# Patient Record
Sex: FEMALE | Race: WHITE | NOT HISPANIC OR LATINO | Employment: OTHER | URBAN - METROPOLITAN AREA
[De-identification: names, ages, dates, MRNs, and addresses within clinical notes are randomized per-mention and may not be internally consistent; named-entity substitution may affect disease eponyms.]

---

## 2017-09-27 ENCOUNTER — HOSPITAL ENCOUNTER (EMERGENCY)
Facility: CLINIC | Age: 67
Discharge: HOME | End: 2017-09-27
Attending: EMERGENCY MEDICINE | Admitting: EMERGENCY MEDICINE
Payer: COMMERCIAL

## 2017-09-27 ENCOUNTER — APPOINTMENT (OUTPATIENT)
Dept: CT IMAGING | Facility: CLINIC | Age: 67
End: 2017-09-27
Attending: EMERGENCY MEDICINE
Payer: COMMERCIAL

## 2017-09-27 ENCOUNTER — APPOINTMENT (OUTPATIENT)
Dept: ULTRASOUND IMAGING | Facility: CLINIC | Age: 67
End: 2017-09-27
Attending: EMERGENCY MEDICINE
Payer: COMMERCIAL

## 2017-09-27 VITALS
HEART RATE: 88 BPM | SYSTOLIC BLOOD PRESSURE: 157 MMHG | RESPIRATION RATE: 20 BRPM | TEMPERATURE: 98 F | OXYGEN SATURATION: 100 % | DIASTOLIC BLOOD PRESSURE: 83 MMHG

## 2017-09-27 DIAGNOSIS — R79.89 D-DIMER, ELEVATED: ICD-10-CM

## 2017-09-27 DIAGNOSIS — M25.561 ACUTE PAIN OF RIGHT KNEE: ICD-10-CM

## 2017-09-27 DIAGNOSIS — R07.9 CHEST PAIN, UNSPECIFIED TYPE: ICD-10-CM

## 2017-09-27 LAB
ANION GAP SERPL CALCULATED.3IONS-SCNC: 7 MMOL/L (ref 3–14)
BASOPHILS # BLD AUTO: 0.1 10E9/L (ref 0–0.2)
BASOPHILS NFR BLD AUTO: 1.3 %
BUN SERPL-MCNC: 14 MG/DL (ref 7–30)
CALCIUM SERPL-MCNC: 8.9 MG/DL (ref 8.5–10.1)
CHLORIDE SERPL-SCNC: 106 MMOL/L (ref 94–109)
CO2 SERPL-SCNC: 26 MMOL/L (ref 20–32)
CREAT BLD-MCNC: 0.7 MG/DL (ref 0.52–1.04)
CREAT SERPL-MCNC: 0.67 MG/DL (ref 0.52–1.04)
DIFFERENTIAL METHOD BLD: NORMAL
EOSINOPHIL # BLD AUTO: 0.2 10E9/L (ref 0–0.7)
EOSINOPHIL NFR BLD AUTO: 3.7 %
ERYTHROCYTE [DISTWIDTH] IN BLOOD BY AUTOMATED COUNT: 12.8 % (ref 10–15)
GFR SERPL CREATININE-BSD FRML MDRD: 83 ML/MIN/1.7M2
GFR SERPL CREATININE-BSD FRML MDRD: 87 ML/MIN/1.7M2
GLUCOSE SERPL-MCNC: 96 MG/DL (ref 70–99)
HCT VFR BLD AUTO: 37 % (ref 35–47)
HGB BLD-MCNC: 12.3 G/DL (ref 11.7–15.7)
IMM GRANULOCYTES # BLD: 0 10E9/L (ref 0–0.4)
IMM GRANULOCYTES NFR BLD: 0.4 %
INTERPRETATION ECG - MUSE: NORMAL
LYMPHOCYTES # BLD AUTO: 2.1 10E9/L (ref 0.8–5.3)
LYMPHOCYTES NFR BLD AUTO: 45.2 %
MCH RBC QN AUTO: 32.4 PG (ref 26.5–33)
MCHC RBC AUTO-ENTMCNC: 33.2 G/DL (ref 31.5–36.5)
MCV RBC AUTO: 97 FL (ref 78–100)
MONOCYTES # BLD AUTO: 0.3 10E9/L (ref 0–1.3)
MONOCYTES NFR BLD AUTO: 6.4 %
NEUTROPHILS # BLD AUTO: 2 10E9/L (ref 1.6–8.3)
NEUTROPHILS NFR BLD AUTO: 43 %
NRBC # BLD AUTO: 0 10*3/UL
NRBC BLD AUTO-RTO: 0 /100
PLATELET # BLD AUTO: 242 10E9/L (ref 150–450)
POTASSIUM SERPL-SCNC: 4.1 MMOL/L (ref 3.4–5.3)
RBC # BLD AUTO: 3.8 10E12/L (ref 3.8–5.2)
SODIUM SERPL-SCNC: 139 MMOL/L (ref 133–144)
TROPONIN I SERPL-MCNC: <0.015 UG/L (ref 0–0.04)
WBC # BLD AUTO: 4.6 10E9/L (ref 4–11)

## 2017-09-27 PROCEDURE — 93005 ELECTROCARDIOGRAM TRACING: CPT

## 2017-09-27 PROCEDURE — 25000128 H RX IP 250 OP 636: Performed by: EMERGENCY MEDICINE

## 2017-09-27 PROCEDURE — 99285 EMERGENCY DEPT VISIT HI MDM: CPT | Mod: 25

## 2017-09-27 PROCEDURE — 93971 EXTREMITY STUDY: CPT | Mod: RT

## 2017-09-27 PROCEDURE — 82565 ASSAY OF CREATININE: CPT

## 2017-09-27 PROCEDURE — 71260 CT THORAX DX C+: CPT

## 2017-09-27 PROCEDURE — 84484 ASSAY OF TROPONIN QUANT: CPT | Performed by: EMERGENCY MEDICINE

## 2017-09-27 PROCEDURE — 85025 COMPLETE CBC W/AUTO DIFF WBC: CPT | Performed by: EMERGENCY MEDICINE

## 2017-09-27 PROCEDURE — 80048 BASIC METABOLIC PNL TOTAL CA: CPT | Performed by: EMERGENCY MEDICINE

## 2017-09-27 RX ORDER — IOPAMIDOL 755 MG/ML
500 INJECTION, SOLUTION INTRAVASCULAR ONCE
Status: COMPLETED | OUTPATIENT
Start: 2017-09-27 | End: 2017-09-27

## 2017-09-27 RX ORDER — ACETAMINOPHEN 325 MG/1
650 TABLET ORAL EVERY 6 HOURS PRN
COMMUNITY

## 2017-09-27 RX ADMIN — SODIUM CHLORIDE 94 ML: 9 INJECTION, SOLUTION INTRAVENOUS at 11:28

## 2017-09-27 RX ADMIN — IOPAMIDOL 76 ML: 755 INJECTION, SOLUTION INTRAVENOUS at 11:28

## 2017-09-27 ASSESSMENT — ENCOUNTER SYMPTOMS
SHORTNESS OF BREATH: 1
VOMITING: 0
COUGH: 1
FEVER: 0
NAUSEA: 1

## 2017-09-27 NOTE — ED AVS SNAPSHOT
[facility]    [address]    [facility] [address]    Phone:  [phone]    Fax:  [phone]                                       Rianna Duffy   MRN: [id number]    Department:  [facility]   Date of Visit:  9/27/2017           After Visit Summary Signature Page     I have received my discharge instructions, and my questions have been answered. I have discussed any challenges I see with this plan with the nurse or doctor.    ..........................................................................................................................................  Patient/Patient Representative Signature      ..........................................................................................................................................  Patient Representative Print Name and Relationship to Patient    ..................................................               ................................................  Date                                            Time    ..........................................................................................................................................  Reviewed by Signature/Title    ...................................................              ..............................................  Date                                                            Time

## 2017-09-27 NOTE — ED AVS SNAPSHOT
[facility]    [address]    [facility] [address]    Phone:  [phone]    Fax:  [phone]                                       Rianna Duffy   MRN: [id number]    Department:  [facility]   Date of Visit:  9/27/2017           Patient Information     Date Of Birth          1950        Your diagnoses for this visit were:     D-dimer, elevated     Chest pain, unspecified type     Acute pain of right knee        You were seen by Velasquez Gill DO.      Follow-up Information     Follow up with Emily Forman MD. Call in 2 days.    Why:  As needed    Contact information:    [facility]  [address]  [place] [address]  [phone]          Follow up with [facility].    Specialty:  EMERGENCY MEDICINE    Why:  If symptoms worsen    Contact information:    [address]  [facility] [address]  [phone]        Discharge Instructions         ???? ? ?????? [Knee Pain, Uncertain Cause]  ?????????? ????????? ???????????????? ?????? ????????????? ???? ? ??????. ? ??? ????????? ?????????? ??????, ?????????????? ??????; ?????? ???????? ????????? ???????; ?????? ?????????? ?????? ??? ?????????? ? ?????? ???????. ????? ????? ??????????? ????????, ???????????? ??????????? ????????? ??????? ? ???? ??? ??????. ?? ?????? ?????? ?????? ????????? ??????????? ???????. ???? ???? ????????? ?? ?????????, ????? ????????????? ?????????? ?????????? ? ???????????? .    ???????? ????  1. ?? ?????????? ???????????? ???? ??? ????? ??????, ???? ?? ??????? ????.  2. ? ?????? ???? ????????????? ?????? ?? ????? (?????????? ? ????????? ??????????? ????? ?? ?????) ? ???????? ????? ?? 20 ????? ????? ?????? 1-2 ????. ? ??????? ????????? ???? ???? ????????????? ?????? ?? ????? 3-4 ???? ? ????, ? ? ?????????? - ?? ?????????????, ????? ????????? ???? ??? ????????.  3. ????? ?????????  ???????????? (Tylenol) ??? ????????? (Motrin, Advil), ????? ??????? ????, ???? ?? ???? ????????? ?????? ??????????. [ ??????????. ??? ??????? ??????????? ??????????? ?????? ??? ?????, ? ????? ??? ???????????? ???? ??????? ??? ????????????? ? ?????????-???????? ?????? ??????????????????? ? ??????, ?????? ??? ????????? ??? ?????????.]  4. ???? ??? ????????????? ???????????? ????????? ??? ??????????????? ??????????????? ??? ??????, ?? ?????????? ???? ????? ?? ??????? ????, ???? ??? ???????? ????. ????? ??? ??? ????????? ? ???????? ??????? ??? ????? ?????? ???????? ? ?????? ????, ????????????? ?? ????? ??????.  5. ??? ????????????? ????????? ?????? ? ????????? ?????? (VELCRO):    ????? ???????????? ????????, ????? ????????? ???;    ???????? ????? ??????? ?? ????? ??????? ??? ???, ???? ??? ?? ???? ???? ????????.  ?????????? ?????? ??????????  ? ?????? ????? ? ??????? 1-2 ?????? ??? ? ???????????? ? ??????????? ??????????, ???? ?? ??????????? ?????????.  [??????????. ???? ??? ??????? ???????, ?????????? ???????? ???????????. ?? ?????? ????????? ? ?????? ??????????? ????? ????????, ??????? ????? ???????? ?? ???????.]  ??????????????? ?????????? ?? ??????????? ???????  ??? ????? ?? ????????????? ???? ?????????:    ????? ??? ?????? ?? ???? ???????, ????????, ??????, ??????, ????????? ???????? ???????????;    ???????? ???? ??? ???????? ? ?????? ??? ?????? ;    ?????? ??? ?????? ?????? ?? ????? ??? ??????????;    ????????? ??????????? ???????? ? ?????? ???????? ;    ????????? ?????????? ??????????? ??? ????????? ;    ?????? ??? ???? ? ????? ;    ??? ??????????? 100.4?F (38?C) ??? ???? ??? ? ???????????? ? ?????????????? ?????.  Date Last Reviewed: 11/23/2015 2000-2017 [employer]. [address] [place] [address], [place] [address]. All rights reserved. This information is not intended as a substitute for professional medical care. Always follow your healthcare professional's instructions.          ???? ? ?????,????????  ????????????? [Chest Pain, Uncertain Cause]  ?? ??????????? ?????? ???????????? ??????, ?????? ??????? ???? ? ????? ?? ????. ???? ????????? ?? ???????????? ?????????, ? ???? ????? ?? ????????? ?????????????. ?????? ?????? ???????? ?????????? ??????????? ????? ?????????? ?????? ?????-?? ?????. ??????? ?????????? ??????? ?? ?????????? ????????? ?????????, ????????????? ?????.    ???????? ????  1. ?? ??????????? ??????? ??????, ?????????? ??????, ?????????.  2. ???? ????????? ????????????? ?????????, ?????????? ?? ???????? ????????? ????? .  ?????????? ?????? ??????????  ? ?????? ????? ??? ? ?????? ??????????? ?????????? ???????? ?????????? ????????? ??? ? ??? ??????, ???? ????????? ?? ????????? ? ??????? ????????? 24 ?????.  ??????????????? ?????????? ?? ??????????? ???????  ??? ????? ?? ????????????? ???? ?????????:    ????????? ????????? ????: ?????????? ??????? ????????, ???? ???????????, ?????? ?????? ??? ???????????????? ?? ?????, ????, ???, ??????? ??? ?????);    ?????? ??? ????????????? ???? ??? ???????;    ????????, ?????????????? ??? ?????? ????????;    ???????????? ???????? ??????? ????? ??? ??????;    ??? ??????????? 100.4?F (38?C) ??? ???? ??? ? ???????????? ? ?????????????? ?????;    ???????, ????? ??? ?????????? ???? ????.  Date Last Reviewed: 12/30/2015 2000-2017 [employer]. [address] [place] [address]. All rights reserved. This information is not intended as a substitute for professional medical care. Always follow your healthcare professional's instructions.          24 Hour Appointment Hotline       To make an appointment at any [facility], call [phone] ([phone]). If you don't have a family doctor or clinic, we will help you find one. [facility] clinics are conveniently located to serve the needs of you and your family.             Review of your medicines      Our records show that you are taking the medicines listed below. If these are incorrect,  please call your family doctor or clinic.        Dose / Directions Last dose taken    ASPIRIN PO        Refills:  0        TYLENOL PO        Refills:  0        UNKNOWN TO PATIENT   Indication:  Pt on BP and Thyroid medicine unsure of name        Refills:  0                Procedures and tests performed during your visit     Basic metabolic panel    CBC with platelets differential    CT Chest Pulmonary Embolism w Contrast    Creatinine POCT    EKG 12 lead    Troponin I    US Lower Extremity Venous Duplex Right      Orders Needing Specimen Collection     None      Pending Results     Date and Time Order Name Status Description    9/27/2017 1038 US Lower Extremity Venous Duplex Right Preliminary             Pending Culture Results     No orders found from 9/25/2017 to 9/28/2017.            Pending Results Instructions     If you had any lab results that were not finalized at the time of your Discharge, you can call the ED Lab Result RN at [phone]. You will be contacted by this team for any positive Lab results or changes in treatment. The nurses are available 7 days a week from 10A to 6:30P.  You can leave a message 24 hours per day and they will return your call.        Test Results From Your Hospital Stay        9/27/2017 11:02 AM      Component Results     Component Value Ref Range & Units Status    WBC 4.6 4.0 - 11.0 10e9/L Final    RBC Count 3.80 3.8 - 5.2 10e12/L Final    Hemoglobin 12.3 11.7 - 15.7 g/dL Final    Hematocrit 37.0 35.0 - 47.0 % Final    MCV 97 78 - 100 fl Final    MCH 32.4 26.5 - 33.0 pg Final    MCHC 33.2 31.5 - 36.5 g/dL Final    RDW 12.8 10.0 - 15.0 % Final    Platelet Count 242 150 - 450 10e9/L Final    Diff Method Automated Method  Final    % Neutrophils 43.0 % Final    % Lymphocytes 45.2 % Final    % Monocytes 6.4 % Final    % Eosinophils 3.7 % Final    % Basophils 1.3 % Final    % Immature Granulocytes 0.4 % Final    Nucleated RBCs 0 0 /100 Final    Absolute Neutrophil 2.0 1.6 - 8.3  10e9/L Final    Absolute Lymphocytes 2.1 0.8 - 5.3 10e9/L Final    Absolute Monocytes 0.3 0.0 - 1.3 10e9/L Final    Absolute Eosinophils 0.2 0.0 - 0.7 10e9/L Final    Absolute Basophils 0.1 0.0 - 0.2 10e9/L Final    Abs Immature Granulocytes 0.0 0 - 0.4 10e9/L Final    Absolute Nucleated RBC 0.0  Final         9/27/2017 11:22 AM      Component Results     Component Value Ref Range & Units Status    Sodium 139 133 - 144 mmol/L Final    Potassium 4.1 3.4 - 5.3 mmol/L Final    Chloride 106 94 - 109 mmol/L Final    Carbon Dioxide 26 20 - 32 mmol/L Final    Anion Gap 7 3 - 14 mmol/L Final    Glucose 96 70 - 99 mg/dL Final    Urea Nitrogen 14 7 - 30 mg/dL Final    Creatinine 0.67 0.52 - 1.04 mg/dL Final    GFR Estimate 87 >60 mL/min/1.7m2 Final    Non  GFR Calc    GFR Estimate If Black >90 >60 mL/min/1.7m2 Final    African American GFR Calc    Calcium 8.9 8.5 - 10.1 mg/dL Final         9/27/2017 11:22 AM      Component Results     Component Value Ref Range & Units Status    Troponin I ES <0.015 0.000 - 0.045 ug/L Final    The 99th percentile for upper reference range is 0.045 ug/L.  Troponin values   in the range of 0.045 - 0.120 ug/L may be associated with risks of adverse   clinical events.           9/27/2017 11:30 AM      Narrative     VENOUS ULTRASOUND RIGHT LEG  9/27/2017 11:20 AM     HISTORY: Right knee pain, swelling status post surgery.    COMPARISON: None.    FINDINGS:  Examination of the deep veins with graded compression and  color flow Doppler with spectral wave form analysis shows no evidence  of thrombus in the common femoral vein, femoral vein, popliteal vein  or calf veins. At the area of patient's pain there is a small  hypoechoic fluid collection measuring 1.1 x 1.0 x 0.3 cm.        Impression     IMPRESSION: No evidence of deep venous thrombosis.  Small fluid  collection along the lateral site of patient's pain measuring 1.1 x  1.0 x 0.3 cm.         9/27/2017 12:05 PM      Narrative      CT CHEST PULMONARY EMBOLISM WITH CONTRAST  9/27/2017 11:34 AM     HISTORY: Pleuritic chest pain, dyspnea, positive D-dimer in clinic.    COMPARISON: None.    TECHNIQUE: Pulmonary embolism protocol with attention to the pulmonary  arteries.  IV contrast: 76 mL Isovue-370. Coronal reconstructions.  Radiation dose for this scan was reduced using automated exposure  control, adjustment of the mA and/or kV according to patient size, or  iterative reconstruction technique.    FINDINGS: No thoracic aortic dissection. No pulmonary embolism  identified. There is liver low density which could relate to the  contrast bolus or fatty infiltration. No pleural effusion or acute  consolidation.        Impression     IMPRESSION: No PE identified.    MAGRARITO HUDSON MD         9/27/2017 10:51 AM      Component Results     Component Value Ref Range & Units Status    Creatinine 0.7 0.52 - 1.04 mg/dL Final    GFR Estimate 83 >60 mL/min/1.7m2 Final    GFR Estimate If Black >90 >60 mL/min/1.7m2 Final                Clinical Quality Measure: Blood Pressure Screening     Your blood pressure was checked while you were in the emergency department today. The last reading we obtained was  BP: 157/83 . Please read the guidelines below about what these numbers mean and what you should do about them.  If your systolic blood pressure (the top number) is less than 120 and your diastolic blood pressure (the bottom number) is less than 80, then your blood pressure is normal. There is nothing more that you need to do about it.  If your systolic blood pressure (the top number) is 120-139 or your diastolic blood pressure (the bottom number) is 80-89, your blood pressure may be higher than it should be. You should have your blood pressure rechecked within a year by a primary care provider.  If your systolic blood pressure (the top number) is 140 or greater or your diastolic blood pressure (the bottom number) is 90 or greater, you may have high blood pressure.  "High blood pressure is treatable, but if left untreated over time it can put you at risk for heart attack, stroke, or kidney failure. You should have your blood pressure rechecked by a primary care provider within the next 4 weeks.  If your provider in the emergency department today gave you specific instructions to follow-up with your doctor or provider even sooner than that, you should follow that instruction and not wait for up to 4 weeks for your follow-up visit.        Thank you for choosing [facility]       Thank you for choosing [facility] for your care. Our goal is always to provide you with excellent care. Hearing back from our patients is one way we can continue to improve our services. Please take a few minutes to complete the written survey that you may receive in the mail after you visit with us. Thank you!        [employer] Information     [employer] lets you send messages to your doctor, view your test results, renew your prescriptions, schedule appointments and more. To sign up, go to [email or web address] . Click on \"Log in\" on the left side of the screen, which will take you to the Welcome page. Then click on \"Sign up Now\" on the right side of the page.     You will be asked to enter the access code listed below, as well as some personal information. Please follow the directions to create your username and password.     Your access code is: [email or web address]  Expires: 2017 12:45 PM     Your access code will  in 90 days. If you need help or a new code, please call your [facility] clinic or [phone].        Care EveryWhere ID     This is your Care EveryWhere ID. This could be used by other organizations to access your [facility] medical records  [id number]        Equal Access to Services     VALENCIA THRASHER : Kana Savage, claus kelly, horacio olson. So [facility] [phone].    ATENCIÓN: Si habla español, tiene a carter disposición " servicios gratuitos de asistencia lingüística. Kaleigh mendoza [phone].    We comply with applicable federal civil rights laws and [place] laws. We do not discriminate on the basis of race, color, national origin, age, disability sex, sexual orientation or gender identity.            After Visit Summary       This is your record. Keep this with you and show to your community pharmacist(s) and doctor(s) at your next visit.

## 2017-09-27 NOTE — ED PROVIDER NOTES
History     Chief Complaint:  Shortness of Breath    The history is provided by the patient and a relative.  used: Daughter translating.      Rianna Duffy is a 67 year old female with a history of hypertension, hypothyroid, and is one month s/p right TKA who presents to the emergency department today for evaluation of shortness of breath and is accompanied by her  and daughter. The patient had a right knee replacement performed by Dr. Neftali Mendez of [facility] on 8/25, and nearly two weeks ago, developed a cough and shortness of breath. The patient was seen by her MD yesterday, with assessment, plan, and results below. Today, the patient was instructed to present to the emergency department after her clinic called, informing them of a positive D dimer. Here, the patient reports some inner right knee pain varying in location over the past few days, some pleuritic chest pain, and some nausea two days ago. She denies nausea, vomiting, or fevers here.     Office Visit from 9/26:  Rianna was seen today for follow up.    Diagnoses and all orders for this visit:  Chest tightness  SOB (shortness of breath)  Patient notes this has been occurring for the past 10-14 days, not worsening, non-exertional. Possibly secondary to deconditioning or atelectasis following surgery; however, cannot rule out cardiac source or DVT and PE as she is approximately 1 month post-op from right TKA, No swelling, pain, erythema, or warmth in her legs. Wells criteria low risk. Discussed with patient options of going to [facility] or emergency department for CTA or more rapid ddimer versus clinic workup. They prefer to do clinic workup. 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 CXR normal. Will order d dimer. Discussed with patient that if this is elevated, will need to go to [facility] or emergency department tonight for further evaluation. Consider stress test if all testing is  normal.    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 Degenerative changes in the visualized thoracic spine    Allergies:  No Known Drug Allergies    Medications:    Levothyroxine  Aspirin  Diovan    Past Medical History:    Hypertension  Hypothyroid    Past Surgical History:    Right TKA    Family History:    History reviewed. No pertinent family history.     Social History:  The patient was accompanied to the ED by her  and daughter.  Marital Status:   [2]     Review of Systems   Constitutional: Negative for fever.   Respiratory: Positive for cough and shortness of breath.    Cardiovascular: Positive for chest pain.   Gastrointestinal: Positive for nausea. Negative for vomiting.   Musculoskeletal:        Right knee pain   All other systems reviewed and are negative.    Physical Exam   Vitals:  Patient Vitals for the past 24 hrs:   BP Temp Temp src Pulse Heart Rate Resp SpO2   09/27/17 1251 - - - - 74 - 100 %   09/27/17 1240 157/83 - - - 75 - 100 %   09/27/17 1210 154/84 - - - 79 - 100 %   09/27/17 1040 133/90 - - - 76 - 96 %   09/27/17 1010 134/87 - - - 79 - 97 %   09/27/17 1005 137/78 - - - - - 96 %   09/27/17 0924 158/83 98  F (36.7  C) Oral 88 - 20 97 %      Physical Exam  Constitutional: Patient appears well-developed and well-nourished. There is mild distress.   Head: No external signs of trauma noted.  Neck: No JVD noted  Eyes: Pupils are equal, round, and reactive to light.   Cardiovascular: Normal rate, regular rhythm and normal heart sounds.  Exam reveals no gallop and no friction rub.  No murmur heard. Normal peripheral pulses.  Pulmonary/Chest: Effort normal and breath sounds normal. No respiratory distress. Patient has no wheezes. Patient has no rales.   Abdominal: Soft. There is no tenderness.   Extremities: No edema noted. Mild right knee swelling with lateral tenderness.   Neurological: Patient is alert and  oriented to person, place, and time.   Skin: Skin is warm and dry. There is no diaphoresis noted. Surgical incision appears well healed. No drainage. No erythema noted.    Emergency Department Course     ECG:  ECG taken at 0933, ECG read at 1040  Normal sinus rhythm  Normal ECG  Rate 78 bpm. IN interval 156. QRS duration 82. QT/QTc 382/435. P-R-T axes 20 15 59.    Imaging:  Radiology findings were communicated with the patient and family who voiced understanding of the findings.    CT Chest Pulmonary Embolism w contrast  No pulmonary embolism identified.  Reading per radiology    US Lower Extremity Venous Duplex Right  No evidence of deep venous thrombosis.  Small fluid  collection along the lateral site of patient's pain measuring 1.1 x  1.0 x 0.3 cm.  Reading per radiology    Laboratory:  Laboratory findings were communicated with the patient and family who voiced understanding of the findings.    CBC: AWNL. (WBC 4.6, HGB 12.3, )   BMP: AWNL (Creatinine 0.67)  Creatinine POCT: 0.7, GFR 83  Troponin (Collected 1008): <0.015    Emergency Department Course:  Nursing notes and vitals reviewed.  I performed an exam of the patient as documented above.   IV was inserted and blood was drawn for laboratory testing, results above.  The patient was sent for a CT Chest Pulmonary Embolism w contrast and US Lower Extremity Venous Duplex Right while in the emergency department, results above.   1101: I spoke with Dr. Emily Forman of the family practice service from [facility] regarding patient's presentation, findings, and plan of care.  At 1254 the patient was rechecked and patient and family were updated on the results of laboratory and imaging studies.   I discussed the treatment plan with the patient and family. They expressed understanding of this plan and consented to discharge. They will be discharged home with instructions for care and follow up. In addition, the patient will return to the emergency  department if their symptoms persist, worsen, if new symptoms arise or if there is any concern.  All questions were answered.  I personally reviewed the laboratory and imaging results with the patient and family and answered all related questions prior to discharge.    Impression & Plan      Medical Decision Making:  Rianna Duffy is a 67 year old female who presents to the emergency department for evaluation of an elevated D dimer. Please see the HPI and examination for specifics. I did discuss the case with the patient's primary care provider and reviewed the note as well. The patient was low risk per Wells criteria, so appropriate D dimer testing was ordered and was unfortunately elevated. Ultrasound of her right lower extremity as well as CT of her chest did not reveal any occlusive thrombus or embolism. At this time, the patient is stable for discharge and should follow up in the outpatient setting. Anticipatory guidance given prior to discharge.    Diagnosis:    ICD-10-CM    1. D-dimer, elevated R79.89    2. Chest pain, unspecified type R07.9    3. Acute pain of right knee M25.561      Disposition:   Home    Scribe Disclosure:  Marie KASPER am serving as a scribe at 10:26 AM on 9/27/2017 to document services personally performed by Velasquez Gill DO, based on my observations and the provider's statements to me.    9/27/2017   [facility] EMERGENCY DEPARTMENT       Velasquez Gill DO  09/27/17 1736

## 2017-09-27 NOTE — ED NOTES
A&Ox3, ABC's intact  Pt with knee replacement Aug 25th, about 2 weeks ago developed cough and shortness of breath, pt in to MD vero and daughter states they were called to come to ER because her test for a blood clot was positive.   PMH: see hx  Meds: Epic up to date per pt

## 2017-09-27 NOTE — DISCHARGE INSTRUCTIONS
???? ? ?????? [Knee Pain, Uncertain Cause]  ?????????? ????????? ???????????????? ?????? ????????????? ???? ? ??????. ? ??? ????????? ?????????? ??????, ?????????????? ??????; ?????? ???????? ????????? ???????; ?????? ?????????? ?????? ??? ?????????? ? ?????? ???????. ????? ????? ??????????? ????????, ???????????? ??????????? ????????? ??????? ? ???? ??? ??????. ?? ?????? ?????? ?????? ????????? ??????????? ???????. ???? ???? ????????? ?? ?????????, ????? ????????????? ?????????? ?????????? ? ???????????? .    ???????? ????  1. ?? ?????????? ???????????? ???? ??? ????? ??????, ???? ?? ??????? ????.  2. ? ?????? ???? ????????????? ?????? ?? ????? (?????????? ? ????????? ??????????? ????? ?? ?????) ? ???????? ????? ?? 20 ????? ????? ?????? 1-2 ????. ? ??????? ????????? ???? ???? ????????????? ?????? ?? ????? 3-4 ???? ? ????, ? ? ?????????? - ?? ?????????????, ????? ????????? ???? ??? ????????.  3. ????? ????????? ???????????? (Tylenol) ??? ????????? (Motrin, Advil), ????? ??????? ????, ???? ?? ???? ????????? ?????? ??????????. [ ??????????. ??? ??????? ??????????? ??????????? ?????? ??? ?????, ? ????? ??? ???????????? ???? ??????? ??? ????????????? ? ?????????-???????? ?????? ??????????????????? ? ??????, ?????? ??? ????????? ??? ?????????.]  4. ???? ??? ????????????? ???????????? ????????? ??? ??????????????? ??????????????? ??? ??????, ?? ?????????? ???? ????? ?? ??????? ????, ???? ??? ???????? ????. ????? ??? ??? ????????? ? ???????? ??????? ??? ????? ?????? ???????? ? ?????? ????, ????????????? ?? ????? ??????.  5. ??? ????????????? ????????? ?????? ? ????????? ?????? (VELCRO):    ????? ???????????? ????????, ????? ????????? ???;    ???????? ????? ??????? ?? ????? ??????? ??? ???, ???? ??? ?? ???? ???? ????????.  ?????????? ?????? ??????????  ? ?????? ????? ? ??????? 1-2 ?????? ??? ? ???????????? ? ??????????? ??????????, ???? ?? ??????????? ?????????.  [??????????. ???? ??? ??????? ???????, ?????????? ????????  ???????????. ?? ?????? ????????? ? ?????? ??????????? ????? ????????, ??????? ????? ???????? ?? ???????.]  ??????????????? ?????????? ?? ??????????? ???????  ??? ????? ?? ????????????? ???? ?????????:    ????? ??? ?????? ?? ???? ???????, ????????, ??????, ??????, ????????? ???????? ???????????;    ???????? ???? ??? ???????? ? ?????? ??? ?????? ;    ?????? ??? ?????? ?????? ?? ????? ??? ??????????;    ????????? ??????????? ???????? ? ?????? ???????? ;    ????????? ?????????? ??????????? ??? ????????? ;    ?????? ??? ???? ? ????? ;    ??? ??????????? 100.4?F (38?C) ??? ???? ??? ? ???????????? ? ?????????????? ?????.  Date Last Reviewed: 11/23/2015 2000-2017 [employer]. [address], [place] [address]. All rights reserved. This information is not intended as a substitute for professional medical care. Always follow your healthcare professional's instructions.          ???? ? ?????,???????? ????????????? [Chest Pain, Uncertain Cause]  ?? ??????????? ?????? ???????????? ??????, ?????? ??????? ???? ? ????? ?? ????. ???? ????????? ?? ???????????? ?????????, ? ???? ????? ?? ????????? ?????????????. ?????? ?????? ???????? ?????????? ??????????? ????? ?????????? ?????? ?????-?? ?????. ??????? ?????????? ??????? ?? ?????????? ????????? ?????????, ????????????? ?????.    ???????? ????  1. ?? ??????????? ??????? ??????, ?????????? ??????, ?????????.  2. ???? ????????? ????????????? ?????????, ?????????? ?? ???????? ????????? ????? .  ?????????? ?????? ??????????  ? ?????? ????? ??? ? ?????? ??????????? ?????????? ???????? ?????????? ????????? ??? ? ??? ??????, ???? ????????? ?? ????????? ? ??????? ????????? 24 ?????.  ??????????????? ?????????? ?? ??????????? ???????  ??? ????? ?? ????????????? ???? ?????????:    ????????? ????????? ????: ?????????? ??????? ????????, ???? ???????????, ?????? ?????? ??? ???????????????? ?? ?????, ????, ???, ??????? ??? ?????);    ?????? ??? ????????????? ???? ???  ???????;    ????????, ?????????????? ??? ?????? ????????;    ???????????? ???????? ??????? ????? ??? ??????;    ??? ??????????? 100.4?F (38?C) ??? ???? ??? ? ???????????? ? ?????????????? ?????;    ???????, ????? ??? ?????????? ???? ????.  Date Last Reviewed: 12/30/2015 2000-2017 [employer]. [address] [place] [address], [place] [address]. All rights reserved. This information is not intended as a substitute for professional medical care. Always follow your healthcare professional's instructions.

## 2021-06-20 ENCOUNTER — APPOINTMENT (OUTPATIENT)
Dept: CT IMAGING | Facility: CLINIC | Age: 71
DRG: 177 | End: 2021-06-20
Attending: EMERGENCY MEDICINE
Payer: COMMERCIAL

## 2021-06-20 ENCOUNTER — HOSPITAL ENCOUNTER (INPATIENT)
Facility: CLINIC | Age: 71
LOS: 3 days | Discharge: HOME | DRG: 177 | End: 2021-06-23
Attending: EMERGENCY MEDICINE | Admitting: INTERNAL MEDICINE
Payer: COMMERCIAL

## 2021-06-20 DIAGNOSIS — R55 SYNCOPE, UNSPECIFIED SYNCOPE TYPE: ICD-10-CM

## 2021-06-20 DIAGNOSIS — J12.82 PNEUMONIA DUE TO 2019 NOVEL CORONAVIRUS: Primary | ICD-10-CM

## 2021-06-20 DIAGNOSIS — U07.1 PNEUMONIA DUE TO 2019 NOVEL CORONAVIRUS: Primary | ICD-10-CM

## 2021-06-20 DIAGNOSIS — U07.1 2019 NOVEL CORONAVIRUS DISEASE (COVID-19): ICD-10-CM

## 2021-06-20 DIAGNOSIS — E87.1 HYPONATREMIA: ICD-10-CM

## 2021-06-20 LAB
ABO + RH BLD: NORMAL
ABO + RH BLD: NORMAL
ALBUMIN SERPL-MCNC: 2.7 G/DL (ref 3.4–5)
ALP SERPL-CCNC: 42 U/L (ref 40–150)
ALT SERPL W P-5'-P-CCNC: 31 U/L (ref 0–50)
ANION GAP SERPL CALCULATED.3IONS-SCNC: 6 MMOL/L (ref 3–14)
APTT PPP: 35 SEC (ref 22–37)
AST SERPL W P-5'-P-CCNC: 41 U/L (ref 0–45)
BASOPHILS # BLD AUTO: 0 10E9/L (ref 0–0.2)
BASOPHILS NFR BLD AUTO: 0 %
BILIRUB DIRECT SERPL-MCNC: 0.1 MG/DL (ref 0–0.2)
BILIRUB SERPL-MCNC: 0.4 MG/DL (ref 0.2–1.3)
BUN SERPL-MCNC: 22 MG/DL (ref 7–30)
CALCIUM SERPL-MCNC: 8.3 MG/DL (ref 8.5–10.1)
CHLORIDE SERPL-SCNC: 99 MMOL/L (ref 94–109)
CO2 SERPL-SCNC: 25 MMOL/L (ref 20–32)
CREAT SERPL-MCNC: 0.96 MG/DL (ref 0.52–1.04)
CRP SERPL-MCNC: 83.8 MG/L (ref 0–8)
D DIMER PPP FEU-MCNC: 0.8 UG/ML FEU (ref 0–0.5)
DIFFERENTIAL METHOD BLD: ABNORMAL
EOSINOPHIL # BLD AUTO: 0 10E9/L (ref 0–0.7)
EOSINOPHIL NFR BLD AUTO: 0 %
ERYTHROCYTE [DISTWIDTH] IN BLOOD BY AUTOMATED COUNT: 12.3 % (ref 10–15)
FIBRINOGEN PPP-MCNC: 490 MG/DL (ref 200–420)
GFR SERPL CREATININE-BSD FRML MDRD: 60 ML/MIN/{1.73_M2}
GLUCOSE SERPL-MCNC: 116 MG/DL (ref 70–99)
HCT VFR BLD AUTO: 34.2 % (ref 35–47)
HGB BLD-MCNC: 11.4 G/DL (ref 11.7–15.7)
IMM GRANULOCYTES # BLD: 0 10E9/L (ref 0–0.4)
IMM GRANULOCYTES NFR BLD: 0.3 %
INR PPP: 1.02 (ref 0.86–1.14)
LABORATORY COMMENT REPORT: ABNORMAL
LACTATE BLD-SCNC: 1.1 MMOL/L (ref 0.7–2)
LDH SERPL L TO P-CCNC: 288 U/L (ref 81–234)
LYMPHOCYTES # BLD AUTO: 1.1 10E9/L (ref 0.8–5.3)
LYMPHOCYTES NFR BLD AUTO: 31.1 %
MCH RBC QN AUTO: 31.4 PG (ref 26.5–33)
MCHC RBC AUTO-ENTMCNC: 33.3 G/DL (ref 31.5–36.5)
MCV RBC AUTO: 94 FL (ref 78–100)
MONOCYTES # BLD AUTO: 0.3 10E9/L (ref 0–1.3)
MONOCYTES NFR BLD AUTO: 7.8 %
NEUTROPHILS # BLD AUTO: 2.2 10E9/L (ref 1.6–8.3)
NEUTROPHILS NFR BLD AUTO: 60.8 %
NRBC # BLD AUTO: 0 10*3/UL
NRBC BLD AUTO-RTO: 0 /100
PLATELET # BLD AUTO: 160 10E9/L (ref 150–450)
POTASSIUM SERPL-SCNC: 3.5 MMOL/L (ref 3.4–5.3)
PROT SERPL-MCNC: 6.1 G/DL (ref 6.8–8.8)
RBC # BLD AUTO: 3.63 10E12/L (ref 3.8–5.2)
SARS-COV-2 RNA RESP QL NAA+PROBE: POSITIVE
SODIUM SERPL-SCNC: 130 MMOL/L (ref 133–144)
SPECIMEN EXP DATE BLD: NORMAL
SPECIMEN SOURCE: ABNORMAL
TROPONIN I SERPL-MCNC: <0.015 UG/L (ref 0–0.04)
WBC # BLD AUTO: 3.6 10E9/L (ref 4–11)

## 2021-06-20 PROCEDURE — 86140 C-REACTIVE PROTEIN: CPT | Performed by: INTERNAL MEDICINE

## 2021-06-20 PROCEDURE — C9803 HOPD COVID-19 SPEC COLLECT: HCPCS

## 2021-06-20 PROCEDURE — 99285 EMERGENCY DEPT VISIT HI MDM: CPT | Mod: 25

## 2021-06-20 PROCEDURE — XW033E5 INTRODUCTION OF REMDESIVIR ANTI-INFECTIVE INTO PERIPHERAL VEIN, PERCUTANEOUS APPROACH, NEW TECHNOLOGY GROUP 5: ICD-10-PCS | Performed by: INTERNAL MEDICINE

## 2021-06-20 PROCEDURE — 86900 BLOOD TYPING SEROLOGIC ABO: CPT | Performed by: INTERNAL MEDICINE

## 2021-06-20 PROCEDURE — 258N000003 HC RX IP 258 OP 636: Performed by: INTERNAL MEDICINE

## 2021-06-20 PROCEDURE — 250N000009 HC RX 250: Performed by: INTERNAL MEDICINE

## 2021-06-20 PROCEDURE — 86901 BLOOD TYPING SEROLOGIC RH(D): CPT | Performed by: INTERNAL MEDICINE

## 2021-06-20 PROCEDURE — 96360 HYDRATION IV INFUSION INIT: CPT | Mod: 59

## 2021-06-20 PROCEDURE — 250N000011 HC RX IP 250 OP 636: Performed by: EMERGENCY MEDICINE

## 2021-06-20 PROCEDURE — 85610 PROTHROMBIN TIME: CPT | Performed by: INTERNAL MEDICINE

## 2021-06-20 PROCEDURE — 120N000001 HC R&B MED SURG/OB

## 2021-06-20 PROCEDURE — 85025 COMPLETE CBC W/AUTO DIFF WBC: CPT | Performed by: EMERGENCY MEDICINE

## 2021-06-20 PROCEDURE — 36415 COLL VENOUS BLD VENIPUNCTURE: CPT | Performed by: INTERNAL MEDICINE

## 2021-06-20 PROCEDURE — 250N000013 HC RX MED GY IP 250 OP 250 PS 637: Performed by: EMERGENCY MEDICINE

## 2021-06-20 PROCEDURE — 85730 THROMBOPLASTIN TIME PARTIAL: CPT | Performed by: INTERNAL MEDICINE

## 2021-06-20 PROCEDURE — 250N000011 HC RX IP 250 OP 636: Performed by: INTERNAL MEDICINE

## 2021-06-20 PROCEDURE — 85384 FIBRINOGEN ACTIVITY: CPT | Performed by: INTERNAL MEDICINE

## 2021-06-20 PROCEDURE — 250N000009 HC RX 250: Performed by: EMERGENCY MEDICINE

## 2021-06-20 PROCEDURE — 80048 BASIC METABOLIC PNL TOTAL CA: CPT | Performed by: EMERGENCY MEDICINE

## 2021-06-20 PROCEDURE — 83615 LACTATE (LD) (LDH) ENZYME: CPT | Performed by: INTERNAL MEDICINE

## 2021-06-20 PROCEDURE — 85379 FIBRIN DEGRADATION QUANT: CPT | Performed by: EMERGENCY MEDICINE

## 2021-06-20 PROCEDURE — 36415 COLL VENOUS BLD VENIPUNCTURE: CPT | Performed by: EMERGENCY MEDICINE

## 2021-06-20 PROCEDURE — 87040 BLOOD CULTURE FOR BACTERIA: CPT | Performed by: EMERGENCY MEDICINE

## 2021-06-20 PROCEDURE — 258N000003 HC RX IP 258 OP 636: Performed by: EMERGENCY MEDICINE

## 2021-06-20 PROCEDURE — 84484 ASSAY OF TROPONIN QUANT: CPT | Performed by: EMERGENCY MEDICINE

## 2021-06-20 PROCEDURE — 250N000012 HC RX MED GY IP 250 OP 636 PS 637: Performed by: INTERNAL MEDICINE

## 2021-06-20 PROCEDURE — 99223 1ST HOSP IP/OBS HIGH 75: CPT | Mod: AI | Performed by: INTERNAL MEDICINE

## 2021-06-20 PROCEDURE — 83605 ASSAY OF LACTIC ACID: CPT | Performed by: EMERGENCY MEDICINE

## 2021-06-20 PROCEDURE — 80076 HEPATIC FUNCTION PANEL: CPT | Performed by: INTERNAL MEDICINE

## 2021-06-20 PROCEDURE — 93005 ELECTROCARDIOGRAM TRACING: CPT

## 2021-06-20 PROCEDURE — 71275 CT ANGIOGRAPHY CHEST: CPT

## 2021-06-20 PROCEDURE — 87635 SARS-COV-2 COVID-19 AMP PRB: CPT | Performed by: EMERGENCY MEDICINE

## 2021-06-20 RX ORDER — POLYETHYLENE GLYCOL 3350 17 G/17G
17 POWDER, FOR SOLUTION ORAL DAILY PRN
Status: DISCONTINUED | OUTPATIENT
Start: 2021-06-20 | End: 2021-06-23 | Stop reason: HOSPADM

## 2021-06-20 RX ORDER — VALSARTAN 160 MG/1
160 TABLET ORAL DAILY
Status: DISCONTINUED | OUTPATIENT
Start: 2021-06-21 | End: 2021-06-23 | Stop reason: HOSPADM

## 2021-06-20 RX ORDER — LEVOTHYROXINE SODIUM 100 UG/1
100 TABLET ORAL DAILY
Status: DISCONTINUED | OUTPATIENT
Start: 2021-06-21 | End: 2021-06-23 | Stop reason: HOSPADM

## 2021-06-20 RX ORDER — LIDOCAINE 40 MG/G
CREAM TOPICAL
Status: DISCONTINUED | OUTPATIENT
Start: 2021-06-20 | End: 2021-06-23 | Stop reason: HOSPADM

## 2021-06-20 RX ORDER — BISACODYL 10 MG
10 SUPPOSITORY, RECTAL RECTAL DAILY PRN
Status: DISCONTINUED | OUTPATIENT
Start: 2021-06-20 | End: 2021-06-23 | Stop reason: HOSPADM

## 2021-06-20 RX ORDER — ONDANSETRON 4 MG/1
4 TABLET, ORALLY DISINTEGRATING ORAL EVERY 6 HOURS PRN
Status: DISCONTINUED | OUTPATIENT
Start: 2021-06-20 | End: 2021-06-23 | Stop reason: HOSPADM

## 2021-06-20 RX ORDER — ASPIRIN 81 MG/1
81 TABLET ORAL DAILY
COMMUNITY

## 2021-06-20 RX ORDER — ASPIRIN 81 MG/1
81 TABLET ORAL DAILY
Status: DISCONTINUED | OUTPATIENT
Start: 2021-06-21 | End: 2021-06-23 | Stop reason: HOSPADM

## 2021-06-20 RX ORDER — IOPAMIDOL 755 MG/ML
500 INJECTION, SOLUTION INTRAVASCULAR ONCE
Status: COMPLETED | OUTPATIENT
Start: 2021-06-20 | End: 2021-06-20

## 2021-06-20 RX ORDER — ONDANSETRON 2 MG/ML
4 INJECTION INTRAMUSCULAR; INTRAVENOUS EVERY 6 HOURS PRN
Status: DISCONTINUED | OUTPATIENT
Start: 2021-06-20 | End: 2021-06-23 | Stop reason: HOSPADM

## 2021-06-20 RX ORDER — ACETAMINOPHEN 650 MG/1
650 SUPPOSITORY RECTAL EVERY 4 HOURS PRN
Status: DISCONTINUED | OUTPATIENT
Start: 2021-06-20 | End: 2021-06-23 | Stop reason: HOSPADM

## 2021-06-20 RX ORDER — LEVOTHYROXINE SODIUM 100 UG/1
100 TABLET ORAL DAILY
COMMUNITY

## 2021-06-20 RX ORDER — ACETAMINOPHEN 500 MG
1000 TABLET ORAL ONCE
Status: COMPLETED | OUTPATIENT
Start: 2021-06-20 | End: 2021-06-20

## 2021-06-20 RX ORDER — AMOXICILLIN 250 MG
1 CAPSULE ORAL 2 TIMES DAILY PRN
Status: DISCONTINUED | OUTPATIENT
Start: 2021-06-20 | End: 2021-06-23 | Stop reason: HOSPADM

## 2021-06-20 RX ORDER — VALSARTAN 160 MG/1
160 TABLET ORAL DAILY
COMMUNITY

## 2021-06-20 RX ORDER — ACETAMINOPHEN 325 MG/1
650 TABLET ORAL EVERY 4 HOURS PRN
Status: DISCONTINUED | OUTPATIENT
Start: 2021-06-20 | End: 2021-06-23 | Stop reason: HOSPADM

## 2021-06-20 RX ORDER — AMOXICILLIN 250 MG
2 CAPSULE ORAL 2 TIMES DAILY PRN
Status: DISCONTINUED | OUTPATIENT
Start: 2021-06-20 | End: 2021-06-23 | Stop reason: HOSPADM

## 2021-06-20 RX ADMIN — SODIUM CHLORIDE 96 ML: 9 INJECTION, SOLUTION INTRAVENOUS at 14:11

## 2021-06-20 RX ADMIN — ACETAMINOPHEN 1000 MG: 500 TABLET, FILM COATED ORAL at 13:21

## 2021-06-20 RX ADMIN — SODIUM CHLORIDE, POTASSIUM CHLORIDE, SODIUM LACTATE AND CALCIUM CHLORIDE 1000 ML: 600; 310; 30; 20 INJECTION, SOLUTION INTRAVENOUS at 13:22

## 2021-06-20 RX ADMIN — SODIUM CHLORIDE 50 ML: 9 INJECTION, SOLUTION INTRAVENOUS at 20:30

## 2021-06-20 RX ADMIN — REMDESIVIR 200 MG: 100 INJECTION, POWDER, LYOPHILIZED, FOR SOLUTION INTRAVENOUS at 19:37

## 2021-06-20 RX ADMIN — ENOXAPARIN SODIUM 40 MG: 40 INJECTION SUBCUTANEOUS at 19:44

## 2021-06-20 RX ADMIN — IOPAMIDOL 78 ML: 755 INJECTION, SOLUTION INTRAVENOUS at 14:11

## 2021-06-20 RX ADMIN — DEXAMETHASONE 6 MG: 2 TABLET ORAL at 19:45

## 2021-06-20 ASSESSMENT — ENCOUNTER SYMPTOMS
NAUSEA: 1
LIGHT-HEADEDNESS: 1
FEVER: 1
SHORTNESS OF BREATH: 1
COUGH: 1
VOMITING: 0

## 2021-06-20 ASSESSMENT — ACTIVITIES OF DAILY LIVING (ADL): ADLS_ACUITY_SCORE: 16

## 2021-06-20 NOTE — ED PROVIDER NOTES
History   Chief Complaint:  Loss of Consciousness      HPI The history is obtained through Micronesian language interpretations provided by the patient's daughter.      Rianna Duffy is a 70 year old female with history of hypertension and hyperlipidemia who presents via EMS for evaluation of a loss of consciousness. Over the last two months the patient has had an intermittent cough with associated shortness of breath, and two weeks ago she started to develop chest pain and pressure that is worse when coughing. Yesterday around 1200 the patient was with her daughter when she had an episode where her hands started to shake and move abnormally, her eyes rolled back, her tongue fell out, and she became unresponsive. Her daughter reports that during this time her lips appeared blue and she became responsive once again after approximately 30 seconds. When the patient woke up she did not recall the event but otherwise was not confused and recognized her daughter and knew where she was. She was not incontinent of bowel or bladder during this episode. She had another almost identical episode yesterday around 1500. Today shortly prior to arrival the patient started to feel lightheaded and nauseated, prompting her daughter to call EMS to bring her into the ED for evaluation. She did not lose consciousness today. EMS reported that the patient was not orthostatic and had room air oxygen saturations of 93%. Here in the ED the patient is also noted to have a fever. They have not been taking her temperature at home and the patient had not complained to her daughter of feeling febrile. She has not traveled recently. She has not had any vomiting. She has never had similar episodes.     Review of Systems   Constitutional: Positive for fever.   Respiratory: Positive for cough and shortness of breath.    Cardiovascular: Positive for chest pain.   Gastrointestinal: Positive for nausea. Negative for vomiting.   Neurological: Positive  for syncope and light-headedness.   All other systems reviewed and are negative.    Allergies:  Lisinopril   Losartan     Medications:  Lipitor   Miralax   Valsartan   Levothyroxine   Aspirin 81 mg     Past Medical History:    Osteopenia   Hyperlipidemia  Hypertension   Hypothyroidism   Degenerative arthritis of knee      Past Surgical History:    Knee surgery, right   Carpal tunnel release, right   Gyn surgery      Social History:  The patient presents to the ED via EMS accompanied by her daughter.   The patient speaks Burmese primarily.     Physical Exam     Patient Vitals for the past 24 hrs:   BP Temp Temp src Pulse Resp SpO2 Weight   06/20/21 1500 128/69 -- -- 71 -- 90 % --   06/20/21 1445 130/67 -- -- 77 25 90 % --   06/20/21 1430 119/59 -- -- 82 28 91 % --   06/20/21 1415 -- 99.9  F (37.7  C) Oral -- -- -- --   06/20/21 1407 -- -- -- -- -- -- 90.7 kg (200 lb)   06/20/21 1400 126/61 -- -- 81 -- 93 % --   06/20/21 1345 133/67 -- -- 87 -- 92 % --   06/20/21 1330 131/66 -- -- 86 -- 92 % --   06/20/21 1315 123/65 -- -- 85 -- 91 % --   06/20/21 1300 (!) 147/76 -- -- 93 -- 93 % --   06/20/21 1258 -- 102.7  F (39.3  C) Oral 93 19 90 % --       Physical Exam    Eyes:    Conjunctiva normal  Neck:    Supple, no meningismus.     CV:     Regular rate and rhythm.      No murmurs, rubs or gallops.       No unilateral leg swelling.       2+ radial pulses bilateral.    PULM:    Scattered inspiratory rales     No respiratory distress.      Good air exchange.     No rales or wheezing.     No stridor.  ABD:    Soft, non-tender, non-distended.       No pulsatile masses.       No rebound, guarding or rigidity.  MSK:     No gross deformity to all four extremities.   LYMPH:   No cervical lymphadenopathy.  NEURO:   Alert. Good muscle tone, no atrophy.  Skin:    Warm, dry and intact.    Psych:    Mood is good and affect is appropriate.        Emergency Department Course   ECG  ECG taken at 13:03:06, ECG read at 1305  Normal sinus  rhythm, Possible inferior infarct, Abnormal ECG    No significant change as compared to prior, dated 2017.  Rate 86 bpm. MA interval 174 ms. QRS duration 82 ms. QT/QTc 356/426 ms. P-R-T axes 38 11 83.     Imaging:  CT Chest Pulmonary Embolism w Contrast:  IMPRESSION:   1.  No evidence of pulmonary embolism.   2.  Patchy bilateral groundglass opacities in a typical appearance for COVID-19. Differential diagnosis includes atypical infection, pneumonitis and connective tissue disease.   Per radiology.      Laboratory:   CBC: WBC 3.6 low, HGB 11.4 low,    BMP:  low, Glucose 116 high, GFR estimate 60 low, Calcium 8.3 low, o/w WNL (Creatinine 0.96)   D dimer quantitative: 0.8 high   Troponin (Collected 1305): <0.015   Lactic acid whole blood: 1.1   Blood culture: Pending x2   UA with Microscopic: Pending   Urine Culture: Pending   Symptomatic SARS-CoV2 (COVID19) Virus PCR: Positive       Emergency Department Course:  Reviewed:  I reviewed nursing notes, vitals and past medical history    Assessments:  1257: I obtained history and examined the patient as noted above.     1435: I updated and reassessed the patient.     Consults:   1453: I spoke with Dr. Mccoy of the hospitalist service regarding patient's presentation, findings, and plan of care.     Interventions:  1321 Tylenol 1,000 mg PO   1322 Lactated ringers 1,000 mL IV     Disposition:  The patient was admitted to the hospital under the care of Dr. Mccoy.       Impression & Plan     Medical Decision Makin-year-old female presents the ED with recurrent episodes of syncope with 2 episodes of witnessed syncope and a third episode of near syncope.  EKG is without dysrhythmia.  Troponin with normal limits.  D-dimer elevated thus underwent CT scan of her chest which reveals no pulmonary embolism, aoric dissection or aortic aneurysm.    She was noted to be febrile on presentation.  COVID swab returned positive and there are radiographic  changes on CT scan consistent with COVID associated inflammatory changes.  Her current infection is likely contributing to her recurrent syncopal episodes in which it is possible she is having transient hypotension or even viral induced myocarditis although lower suspicion.  Given the recurrent episodes of syncope, patient will require admission.  Although she is COVID positive, she is not oxygen dependent at this time with O2 saturations between 91 to 93% thus no indication for IV steroids this time.      Covid-19  Rianna Duffy was evaluated during a global COVID-19 pandemic, which necessitated consideration that the patient might be at risk for infection with the SARS-CoV-2 virus that causes COVID-19.   Applicable protocols for evaluation were followed during the patient's care.   COVID-19 was considered as part of the patient's evaluation. The plan for testing is:  a test was obtained during this visit.     Diagnosis:    ICD-10-CM    1. Syncope, unspecified syncope type  R55 Troponin I     Troponin I     CANCELED: Troponin I   2. 2019 novel coronavirus disease (COVID-19)  U07.1    3. Hyponatremia  E87.1         Scribe Disclosure:  I, Elfego Bedoya, am serving as a scribe at 12:57 PM on 6/20/2021 to document services personally performed by Dr. Benitez, based on my observations and the provider's statements to me.         Balwinder Benitez MD  06/23/21 0712

## 2021-06-20 NOTE — H&P
[facility]    History and Physical - Hospitalist Service       Date of Admission:  6/20/2021    Assessment & Plan   Rianna Duffy is a 70 year old female admitted on 6/20/2021. She has a past medical history significant for hypothyroidism and hypertension.  She has not had a COVID-19 vaccine.  She presented to emergency room with fever, cough, shortness of breath, and reported syncopal event.  Found to have COVID-19 pneumonia.    1.  COVID-19 pneumonia.  Positive SARS-CoV-2 PCR on 6/20.  Symptoms began 1 week ago.  -Add LFTs to previously drawn labs.  -Dexamethasone 6 mg a day with plan for 10-day course.  -IV remdesivir for 5-day course as long as LFTs not significantly elevated.  -Supplemental oxygen as needed.  -Enoxaparin 40 mg subcutaneous daily.    2.  Hypothyroidism.  Restart levothyroxine.    3.  Hyponatremia.  Mild.    -Recheck metabolic panel tomorrow.    4.  Hypertension.  Restart valsartan.    5.  Reported syncopal events.  -Monitor on telemetry.  -Check echocardiogram tomorrow.    6.  Leukopenia.  Mild.  Suspect related to acute COVID-19 infection.  -Recheck CBC tomorrow.    7.  Hyperglycemia.  Mild.  No known history of diabetes.  -Check fasting glucose tomorrow morning.     Diet: Combination Diet Regular Diet Adult    DVT Prophylaxis: Enoxaparin (Lovenox) SQ  Ventura Catheter: not present  Code Status: Full Code           Disposition Plan   Expected discharge: 4 - 7 days    Jack Mccoy DO  [facility]  Contact information available via [facility] Paging/Directory      ______________________________________________________________________    Chief Complaint   Fever, cough, shortness of breath, syncopal event.    History is obtained from the patient    History of Present Illness   Rianna Duffy is a 70 year old female who has a past medical history significant for hypertension and hypothyroidism.  All information obtained through Panamanian  .  Patient reportedly had 2 syncopal events yesterday.  She does not remember these events herself.  They were reportedly witnessed by family.  No family members in the room at this time to give details.  Patient did feel like she was going to have another episode today.  Was brought to emergency room for further evaluation.  She does note that she has had a cough for the past 1 week.  Has also felt like she has been hot at times but has not checked her temperature.  She has been short of breath with this.  Has had body aches.  These body aches have included an aching sensation in her chest.  She has not had any specific chest pain other than the achiness associated with generalized body aches.  Has not noticed any palpitations or skipped heartbeats.  Does feel weak compared to usual.  Denies diarrhea, constipation, or dysuria.  No other acute complaints.  Has not been told that she has had syncopal events in the past.    Review of Systems    The 10 point Review of Systems is negative other than noted in the HPI    Past Medical History    I have reviewed this patient's medical history and updated it with pertinent information if needed.     Hypothyroidism.  Hypertension.    Past Surgical History   I have reviewed this patient's surgical history and updated it with pertinent information if needed.  Past Surgical History:   Procedure Laterality Date     KNEE SURGERY         Social History   I have reviewed this patient's social history and updated it with pertinent information if needed.  Social History     Tobacco Use     Smoking status: Never Smoker     Smokeless tobacco: Never Used   Substance Use Topics     Alcohol use: None     Drug use: None       Family History     She does not know of any coronary disease or cancer in the immediate family.      Prior to Admission Medications   Prior to Admission Medications   Prescriptions Last Dose Informant Patient Reported? Taking?   acetaminophen (TYLENOL) 325  MG tablet 6/20/2021 at am  Yes Yes   Sig: Take 650 mg by mouth every 6 hours as needed    aspirin 81 MG EC tablet 6/20/2021 at am  Yes Yes   Sig: Take 81 mg by mouth daily   levothyroxine (SYNTHROID/LEVOTHROID) 100 MCG tablet 6/20/2021 at am  Yes Yes   Sig: Take 100 mcg by mouth daily   valsartan (DIOVAN) 160 MG tablet 6/20/2021 at am  Yes Yes   Sig: Take 160 mg by mouth daily      Facility-Administered Medications: None     Allergies   No Known Allergies    Physical Exam   Vital Signs: Temp: 98.6  F (37  C) Temp src: Oral BP: 120/76 Pulse: 79   Resp: 16 SpO2: 92 % O2 Device: None (Room air)    Weight: 200 lbs 0 oz    Gen:  NAD, A&O seemingly x3.  All information obtained with help of Burmese .  Eyes:  PERRL, sclera anicteric.  OP:  MMM, no lesions.  Neck:  Supple.  CV:  Regular, no murmurs.  Lung:  CTA b/l, normal effort.  Ab:  +BS, soft.  Skin:  Warm, dry to touch.  No rash.  Ext:  No pitting edema LE b/l.      Data   Data reviewed today: I reviewed all medications, new labs and imaging results over the last 24 hours. I personally reviewed the EKG tracing showing Sinus rhythm, no obvious acute ischemic change.    Recent Labs   Lab 06/20/21  1305   WBC 3.6*   HGB 11.4*   MCV 94      *   POTASSIUM 3.5   CHLORIDE 99   CO2 25   BUN 22   CR 0.96   ANIONGAP 6   MEHRAN 8.3*   *   TROPI <0.015

## 2021-06-20 NOTE — ED NOTES
[facility]  ED Nurse Handoff Report    Rianna Duffy is a 70 year old female   ED Chief complaint: Syncope  . ED Diagnosis:   Final diagnoses:   Syncope, unspecified syncope type   2019 novel coronavirus disease (COVID-19)   Hyponatremia     Allergies: No Known Allergies    Code Status: Full Code  Activity level - Baseline/Home:  Independent. Activity Level - Current:   Stand by Assist. Lift room needed: No. Bariatric: No   Needed: No   Isolation: Yes. Infection: COVID r/o and special precautions.     Vital Signs:   Vitals:    06/20/21 1415 06/20/21 1430 06/20/21 1445 06/20/21 1500   BP:  119/59 130/67 128/69   Pulse:  82 77 71   Resp:  28 25    Temp: 99.9  F (37.7  C)      TempSrc: Oral      SpO2:  91% 90% 90%   Weight:           Cardiac Rhythm:  ,   Cardiac  Cardiac Rhythm: Normal sinus rhythm  Pain level:    Patient confused: No. Patient Falls Risk: Yes.   Elimination Status: Denies need to void during ER stay   Patient Report - Initial Complaint: Syncope. Focused Assessment: Rianna Duffy is a 70 year old female with history of hypertension and hyperlipidemia who presents via EMS for evaluation of a loss of consciousness. Over the last two months the patient has had an intermittent cough with associated shortness of breath, and two weeks ago she started to develop chest pain and pressure that is worse when coughing. Yesterday around 1200 the patient was with her daughter when she had an episode where her hands started to shake and move abnormally, her eyes rolled back, her tongue fell out, and she became unresponsive. Her daughter reports that during this time her lips appeared blue and she became responsive once again after approximately 30 seconds. When the patient woke up she did not recall the event but otherwise was not confused and recognized her daughter and knew where she was. She was not incontinent of bowel or bladder during this episode. She had another almost  identical episode yesterday around 1500. Today shortly prior to arrival the patient started to feel lightheaded and nauseated, prompting her daughter to call EMS to bring her into the ED for evaluation. She did not lose consciousness today. EMS reported that the patient was not orthostatic and had room air oxygen saturations of 93%. Here in the ED the patient is also noted to have a fever. They have not been taking her temperature at home and the patient had not complained to her daughter of feeling febrile. She has not traveled recently. She has not had any vomiting. She has never had similar episodes.   Tests Performed: Labs, CT, EKG Abnormal Results:   Labs Ordered and Resulted from Time of ED Arrival Up to the Time of Departure from the ED   CBC WITH PLATELETS DIFFERENTIAL - Abnormal; Notable for the following components:       Result Value    WBC 3.6 (*)     RBC Count 3.63 (*)     Hemoglobin 11.4 (*)     Hematocrit 34.2 (*)     All other components within normal limits   BASIC METABOLIC PANEL - Abnormal; Notable for the following components:    Sodium 130 (*)     Glucose 116 (*)     GFR Estimate 60 (*)     Calcium 8.3 (*)     All other components within normal limits   D DIMER QUANTITATIVE - Abnormal; Notable for the following components:    D Dimer 0.8 (*)     All other components within normal limits   SARS-COV-2 (COVID-19) VIRUS RT-PCR - Abnormal; Notable for the following components:    SARS-CoV-2 PCR Result POSITIVE (*)     All other components within normal limits   LACTIC ACID WHOLE BLOOD   TROPONIN I   ROUTINE UA WITH MICROSCOPIC   PERIPHERAL IV CATHETER   CARDIAC CONTINUOUS MONITORING   BLOOD CULTURE   BLOOD CULTURE   URINE CULTURE AEROBIC BACTERIAL   BLOOD CULTURE     CT Chest Pulmonary Embolism w Contrast   Final Result   IMPRESSION:   1.  No evidence of pulmonary embolism.   2.  Patchy bilateral groundglass opacities in a typical appearance for   COVID-19. Differential diagnosis includes atypical  infection,   pneumonitis and connective tissue disease.      CONNER DICK MD           Treatments provided: see EMAR  Family Comments: daughters at home  OBS brochure/video discussed/provided to patient:  N/A  ED Medications:   Medications   lactated ringers BOLUS 1,000 mL (0 mLs Intravenous Stopped 6/20/21 1424)   acetaminophen (TYLENOL) tablet 1,000 mg (1,000 mg Oral Given 6/20/21 1321)   CT Scan Flush (96 mLs Intravenous Given 6/20/21 1411)   iopamidol (ISOVUE-370) solution 500 mL (78 mLs Intravenous Given 6/20/21 1411)     Drips infusing:  No  For the majority of the shift, the patient's behavior Green. Interventions performed were NA.    Sepsis treatment initiated: No     Patient tested for COVID 19 prior to admission: YES    ED Nurse Name/Phone Number: Angeli Lieberman RN,   3:10 PM  RECEIVING UNIT ED HANDOFF REVIEW    Above ED Nurse Handoff Report was reviewed: Yes  Reviewed by: Maye Faust RN on June 20, 2021 at 3:56 PM  RECEIVING UNIT ED HANDOFF REVIEW    Above ED Nurse Handoff Report was reviewed: Yes  Reviewed by: Maye Faust RN on June 20, 2021 at 3:57 PM

## 2021-06-20 NOTE — PHARMACY-ADMISSION MEDICATION HISTORY
Admission medication history interview status for this patient is complete. See [facility] admission navigator for allergy information, prior to admission medications and immunization status.     Medication history interview done, indicate source(s): Family - daughterDestinee  Medication history resources (including written lists, pill bottles, clinic record): SureScripts and Care Everywhere  Pharmacy: [facility]. [facility] for discharge    Changes made to PTA medication list:  Added: levothyroxine and valsartan  Deleted: none  Changed: none    Actions taken by pharmacist (provider contacted, etc): Pt COVID+, called pt's daughter to verify home med list     Additional medication history information:None    Medication reconciliation/reorder completed by provider prior to medication history?  N   (Y/N)       Prior to Admission medications    Medication Sig Last Dose Taking? Auth Provider   acetaminophen (TYLENOL) 325 MG tablet Take 650 mg by mouth every 6 hours as needed  6/20/2021 at am Yes Reported, Patient   aspirin 81 MG EC tablet Take 81 mg by mouth daily 6/20/2021 at am Yes Unknown, Entered By History   levothyroxine (SYNTHROID/LEVOTHROID) 100 MCG tablet Take 100 mcg by mouth daily 6/20/2021 at am Yes Unknown, Entered By History   valsartan (DIOVAN) 160 MG tablet Take 160 mg by mouth daily 6/20/2021 at am Yes Unknown, Entered By History

## 2021-06-20 NOTE — ED TRIAGE NOTES
Per EMS, pt here for syncopal episode with possible seizure or stroke activity per family. Pt has upcoming stress test this week. On arrival, pt is alert, no acute signs of distress.

## 2021-06-21 ENCOUNTER — APPOINTMENT (OUTPATIENT)
Dept: CARDIOLOGY | Facility: CLINIC | Age: 71
DRG: 177 | End: 2021-06-21
Attending: INTERNAL MEDICINE
Payer: COMMERCIAL

## 2021-06-21 LAB
ALBUMIN UR-MCNC: 50 MG/DL
ANION GAP SERPL CALCULATED.3IONS-SCNC: 7 MMOL/L (ref 3–14)
APPEARANCE UR: ABNORMAL
BILIRUB UR QL STRIP: NEGATIVE
BUN SERPL-MCNC: 15 MG/DL (ref 7–30)
CALCIUM SERPL-MCNC: 8.1 MG/DL (ref 8.5–10.1)
CHLORIDE SERPL-SCNC: 106 MMOL/L (ref 94–109)
CO2 SERPL-SCNC: 23 MMOL/L (ref 20–32)
COLOR UR AUTO: YELLOW
CREAT SERPL-MCNC: 0.72 MG/DL (ref 0.52–1.04)
CRP SERPL-MCNC: 116 MG/L (ref 0–8)
D DIMER PPP FEU-MCNC: 0.6 UG/ML FEU (ref 0–0.5)
ERYTHROCYTE [DISTWIDTH] IN BLOOD BY AUTOMATED COUNT: 12.2 % (ref 10–15)
FIBRINOGEN PPP-MCNC: 569 MG/DL (ref 200–420)
GFR SERPL CREATININE-BSD FRML MDRD: 84 ML/MIN/{1.73_M2}
GLUCOSE SERPL-MCNC: 134 MG/DL (ref 70–99)
GLUCOSE UR STRIP-MCNC: NEGATIVE MG/DL
HCT VFR BLD AUTO: 33.1 % (ref 35–47)
HGB BLD-MCNC: 11.3 G/DL (ref 11.7–15.7)
HGB UR QL STRIP: NEGATIVE
INTERPRETATION ECG - MUSE: NORMAL
KETONES UR STRIP-MCNC: 60 MG/DL
LEUKOCYTE ESTERASE UR QL STRIP: NEGATIVE
MCH RBC QN AUTO: 31.8 PG (ref 26.5–33)
MCHC RBC AUTO-ENTMCNC: 34.1 G/DL (ref 31.5–36.5)
MCV RBC AUTO: 93 FL (ref 78–100)
MUCOUS THREADS #/AREA URNS LPF: PRESENT /LPF
NITRATE UR QL: NEGATIVE
PH UR STRIP: 6 PH (ref 5–7)
PLATELET # BLD AUTO: 165 10E9/L (ref 150–450)
POTASSIUM SERPL-SCNC: 3.8 MMOL/L (ref 3.4–5.3)
RBC # BLD AUTO: 3.55 10E12/L (ref 3.8–5.2)
RBC #/AREA URNS AUTO: 1 /HPF (ref 0–2)
SODIUM SERPL-SCNC: 136 MMOL/L (ref 133–144)
SOURCE: ABNORMAL
SP GR UR STRIP: 1.03 (ref 1–1.03)
SQUAMOUS #/AREA URNS AUTO: 1 /HPF (ref 0–1)
TRANS CELLS #/AREA URNS HPF: 2 /HPF (ref 0–1)
UROBILINOGEN UR STRIP-MCNC: NORMAL MG/DL (ref 0–2)
WBC # BLD AUTO: 2 10E9/L (ref 4–11)
WBC #/AREA URNS AUTO: 3 /HPF (ref 0–5)

## 2021-06-21 PROCEDURE — 258N000003 HC RX IP 258 OP 636: Performed by: INTERNAL MEDICINE

## 2021-06-21 PROCEDURE — 250N000012 HC RX MED GY IP 250 OP 636 PS 637: Performed by: INTERNAL MEDICINE

## 2021-06-21 PROCEDURE — 93306 TTE W/DOPPLER COMPLETE: CPT | Mod: 26 | Performed by: INTERNAL MEDICINE

## 2021-06-21 PROCEDURE — 80048 BASIC METABOLIC PNL TOTAL CA: CPT | Performed by: INTERNAL MEDICINE

## 2021-06-21 PROCEDURE — 93306 TTE W/DOPPLER COMPLETE: CPT

## 2021-06-21 PROCEDURE — 120N000001 HC R&B MED SURG/OB

## 2021-06-21 PROCEDURE — 99233 SBSQ HOSP IP/OBS HIGH 50: CPT | Performed by: INTERNAL MEDICINE

## 2021-06-21 PROCEDURE — 85379 FIBRIN DEGRADATION QUANT: CPT | Performed by: INTERNAL MEDICINE

## 2021-06-21 PROCEDURE — 250N000011 HC RX IP 250 OP 636: Performed by: INTERNAL MEDICINE

## 2021-06-21 PROCEDURE — 250N000013 HC RX MED GY IP 250 OP 250 PS 637: Performed by: INTERNAL MEDICINE

## 2021-06-21 PROCEDURE — 85027 COMPLETE CBC AUTOMATED: CPT | Performed by: INTERNAL MEDICINE

## 2021-06-21 PROCEDURE — 250N000009 HC RX 250: Performed by: INTERNAL MEDICINE

## 2021-06-21 PROCEDURE — 85384 FIBRINOGEN ACTIVITY: CPT | Performed by: INTERNAL MEDICINE

## 2021-06-21 PROCEDURE — 81001 URINALYSIS AUTO W/SCOPE: CPT | Performed by: EMERGENCY MEDICINE

## 2021-06-21 PROCEDURE — 87086 URINE CULTURE/COLONY COUNT: CPT | Performed by: EMERGENCY MEDICINE

## 2021-06-21 PROCEDURE — 86140 C-REACTIVE PROTEIN: CPT | Performed by: INTERNAL MEDICINE

## 2021-06-21 PROCEDURE — 36415 COLL VENOUS BLD VENIPUNCTURE: CPT | Performed by: INTERNAL MEDICINE

## 2021-06-21 RX ORDER — CODEINE PHOSPHATE AND GUAIFENESIN 10; 100 MG/5ML; MG/5ML
5 SOLUTION ORAL EVERY 4 HOURS PRN
Status: DISCONTINUED | OUTPATIENT
Start: 2021-06-21 | End: 2021-06-23 | Stop reason: HOSPADM

## 2021-06-21 RX ORDER — NALOXONE HYDROCHLORIDE 0.4 MG/ML
0.4 INJECTION, SOLUTION INTRAMUSCULAR; INTRAVENOUS; SUBCUTANEOUS
Status: DISCONTINUED | OUTPATIENT
Start: 2021-06-21 | End: 2021-06-23 | Stop reason: HOSPADM

## 2021-06-21 RX ORDER — NALOXONE HYDROCHLORIDE 0.4 MG/ML
0.2 INJECTION, SOLUTION INTRAMUSCULAR; INTRAVENOUS; SUBCUTANEOUS
Status: DISCONTINUED | OUTPATIENT
Start: 2021-06-21 | End: 2021-06-23 | Stop reason: HOSPADM

## 2021-06-21 RX ORDER — BENZONATATE 100 MG/1
100 CAPSULE ORAL 3 TIMES DAILY PRN
Status: DISCONTINUED | OUTPATIENT
Start: 2021-06-21 | End: 2021-06-23 | Stop reason: HOSPADM

## 2021-06-21 RX ADMIN — SODIUM CHLORIDE 50 ML: 9 INJECTION, SOLUTION INTRAVENOUS at 17:43

## 2021-06-21 RX ADMIN — ENOXAPARIN SODIUM 40 MG: 40 INJECTION SUBCUTANEOUS at 17:36

## 2021-06-21 RX ADMIN — VALSARTAN 160 MG: 160 TABLET, FILM COATED ORAL at 08:46

## 2021-06-21 RX ADMIN — OXYCODONE HYDROCHLORIDE 2.5 MG: 5 TABLET ORAL at 10:05

## 2021-06-21 RX ADMIN — ACETAMINOPHEN 650 MG: 325 TABLET, FILM COATED ORAL at 12:35

## 2021-06-21 RX ADMIN — REMDESIVIR 100 MG: 100 INJECTION, POWDER, LYOPHILIZED, FOR SOLUTION INTRAVENOUS at 17:36

## 2021-06-21 RX ADMIN — BENZONATATE 100 MG: 100 CAPSULE ORAL at 19:28

## 2021-06-21 RX ADMIN — DOCUSATE SODIUM 50 MG AND SENNOSIDES 8.6 MG 1 TABLET: 8.6; 5 TABLET, FILM COATED ORAL at 19:29

## 2021-06-21 RX ADMIN — ASPIRIN 81 MG: 81 TABLET ORAL at 08:46

## 2021-06-21 RX ADMIN — LEVOTHYROXINE SODIUM 100 MCG: 0.1 TABLET ORAL at 08:46

## 2021-06-21 RX ADMIN — DEXAMETHASONE 6 MG: 2 TABLET ORAL at 12:33

## 2021-06-21 ASSESSMENT — ACTIVITIES OF DAILY LIVING (ADL)
ADLS_ACUITY_SCORE: 19
ADLS_ACUITY_SCORE: 14
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
## 2021-06-21 NOTE — PLAN OF CARE
"Presentation/Diagnosis: Pt admitted 6/20 for episodes of LOC, coughing and fever. Covid +   History: HTN, hypothyroidism  Labs/Protocols: Na: 136   Vitals: VSS. Pain reported as \"rib pain\" from coughing.   Respiratory: LS dim in all fields. RA. Crackles noted on R side of lungs.   Neuro: Aox4.   GI/: No BM since 6/16. Stool softeners offered but pt declined saying that she believes it is because her appetite has been poor.   Skin: WDL   LDA's: R SL   Diet: Reg   Activity: SBA    Plan: Continue to monitor, plan is to discharge in 4-7 days.   "

## 2021-06-21 NOTE — PLAN OF CARE
Vitals stable and afebrile. Started on decadron, lovenox and remdesivir this evening. Tolerated without difficulty. Appetite poor. Complained of feeling cold, heat increased on thermostat and extra blanket provided. Denies pain.

## 2021-06-21 NOTE — PLAN OF CARE
Pt rested quiet this shift, sets off bed alarm when going to BR, pt is steady when ambulating, denies pain, lungs diminished and clear, no coughing noted when with pt. Sats  95% on RA.

## 2021-06-22 LAB
ANION GAP SERPL CALCULATED.3IONS-SCNC: 5 MMOL/L (ref 3–14)
BACTERIA SPEC CULT: NORMAL
BUN SERPL-MCNC: 24 MG/DL (ref 7–30)
CALCIUM SERPL-MCNC: 8.7 MG/DL (ref 8.5–10.1)
CHLORIDE SERPL-SCNC: 109 MMOL/L (ref 94–109)
CO2 SERPL-SCNC: 24 MMOL/L (ref 20–32)
CREAT SERPL-MCNC: 0.65 MG/DL (ref 0.52–1.04)
CRP SERPL-MCNC: 70.9 MG/L (ref 0–8)
D DIMER PPP FEU-MCNC: 0.5 UG/ML FEU (ref 0–0.5)
ERYTHROCYTE [DISTWIDTH] IN BLOOD BY AUTOMATED COUNT: 12.4 % (ref 10–15)
FIBRINOGEN PPP-MCNC: 546 MG/DL (ref 200–420)
GFR SERPL CREATININE-BSD FRML MDRD: 90 ML/MIN/{1.73_M2}
GLUCOSE SERPL-MCNC: 141 MG/DL (ref 70–99)
HCT VFR BLD AUTO: 35 % (ref 35–47)
HGB BLD-MCNC: 11.6 G/DL (ref 11.7–15.7)
Lab: NORMAL
MCH RBC QN AUTO: 31.1 PG (ref 26.5–33)
MCHC RBC AUTO-ENTMCNC: 33.1 G/DL (ref 31.5–36.5)
MCV RBC AUTO: 94 FL (ref 78–100)
PLATELET # BLD AUTO: 219 10E9/L (ref 150–450)
POTASSIUM SERPL-SCNC: 3.9 MMOL/L (ref 3.4–5.3)
RBC # BLD AUTO: 3.73 10E12/L (ref 3.8–5.2)
SODIUM SERPL-SCNC: 138 MMOL/L (ref 133–144)
SPECIMEN SOURCE: NORMAL
WBC # BLD AUTO: 3.4 10E9/L (ref 4–11)

## 2021-06-22 PROCEDURE — 250N000013 HC RX MED GY IP 250 OP 250 PS 637: Performed by: INTERNAL MEDICINE

## 2021-06-22 PROCEDURE — 85379 FIBRIN DEGRADATION QUANT: CPT | Performed by: INTERNAL MEDICINE

## 2021-06-22 PROCEDURE — 86140 C-REACTIVE PROTEIN: CPT | Performed by: INTERNAL MEDICINE

## 2021-06-22 PROCEDURE — 85384 FIBRINOGEN ACTIVITY: CPT | Performed by: INTERNAL MEDICINE

## 2021-06-22 PROCEDURE — 85027 COMPLETE CBC AUTOMATED: CPT | Performed by: INTERNAL MEDICINE

## 2021-06-22 PROCEDURE — 250N000011 HC RX IP 250 OP 636: Performed by: INTERNAL MEDICINE

## 2021-06-22 PROCEDURE — 99232 SBSQ HOSP IP/OBS MODERATE 35: CPT | Performed by: INTERNAL MEDICINE

## 2021-06-22 PROCEDURE — 250N000009 HC RX 250: Performed by: INTERNAL MEDICINE

## 2021-06-22 PROCEDURE — 99207 PR CDG-MDM COMPONENT: MEETS LOW - DOWN CODED: CPT | Performed by: INTERNAL MEDICINE

## 2021-06-22 PROCEDURE — 120N000001 HC R&B MED SURG/OB

## 2021-06-22 PROCEDURE — 80048 BASIC METABOLIC PNL TOTAL CA: CPT | Performed by: INTERNAL MEDICINE

## 2021-06-22 PROCEDURE — 258N000003 HC RX IP 258 OP 636: Performed by: INTERNAL MEDICINE

## 2021-06-22 PROCEDURE — 36415 COLL VENOUS BLD VENIPUNCTURE: CPT | Performed by: INTERNAL MEDICINE

## 2021-06-22 PROCEDURE — 250N000012 HC RX MED GY IP 250 OP 636 PS 637: Performed by: INTERNAL MEDICINE

## 2021-06-22 RX ORDER — HYDROMORPHONE HYDROCHLORIDE 1 MG/ML
0.3 INJECTION, SOLUTION INTRAMUSCULAR; INTRAVENOUS; SUBCUTANEOUS
Status: DISCONTINUED | OUTPATIENT
Start: 2021-06-22 | End: 2021-06-23 | Stop reason: HOSPADM

## 2021-06-22 RX ADMIN — DOCUSATE SODIUM 50 MG AND SENNOSIDES 8.6 MG 1 TABLET: 8.6; 5 TABLET, FILM COATED ORAL at 09:52

## 2021-06-22 RX ADMIN — VALSARTAN 160 MG: 160 TABLET, FILM COATED ORAL at 09:51

## 2021-06-22 RX ADMIN — HYDROMORPHONE HYDROCHLORIDE 0.3 MG: 1 INJECTION, SOLUTION INTRAMUSCULAR; INTRAVENOUS; SUBCUTANEOUS at 13:04

## 2021-06-22 RX ADMIN — OXYCODONE HYDROCHLORIDE 2.5 MG: 5 TABLET ORAL at 09:51

## 2021-06-22 RX ADMIN — BENZONATATE 100 MG: 100 CAPSULE ORAL at 09:51

## 2021-06-22 RX ADMIN — ENOXAPARIN SODIUM 40 MG: 40 INJECTION SUBCUTANEOUS at 18:44

## 2021-06-22 RX ADMIN — REMDESIVIR 100 MG: 100 INJECTION, POWDER, LYOPHILIZED, FOR SOLUTION INTRAVENOUS at 16:52

## 2021-06-22 RX ADMIN — ACETAMINOPHEN 650 MG: 325 TABLET, FILM COATED ORAL at 21:49

## 2021-06-22 RX ADMIN — SODIUM CHLORIDE 50 ML: 9 INJECTION, SOLUTION INTRAVENOUS at 17:02

## 2021-06-22 RX ADMIN — LEVOTHYROXINE SODIUM 100 MCG: 0.1 TABLET ORAL at 09:51

## 2021-06-22 RX ADMIN — ASPIRIN 81 MG: 81 TABLET ORAL at 09:51

## 2021-06-22 RX ADMIN — DEXAMETHASONE 6 MG: 2 TABLET ORAL at 13:04

## 2021-06-22 ASSESSMENT — ACTIVITIES OF DAILY LIVING (ADL)
ADLS_ACUITY_SCORE: 19
ADLS_ACUITY_SCORE: 18

## 2021-06-22 NOTE — PROGRESS NOTES
A/O x4,  used for communication. VSS, afebrile. Pt sweaty and clammy at 1730, gown changed, turned down the heat in the room, and cold towel around neck which was effective.   Remdesivir completed. SOB with activity, fine crackles R lung. Denies pain.

## 2021-06-22 NOTE — PROGRESS NOTES
A/O x 4. VSS on RA. Denied pain/CP. MERCADO. PIV to right intact, SL. Up SBA, good UOP, passing flatus. Slept well this shift. Maintained covid iso precautions. Will continue POC.

## 2021-06-22 NOTE — PROGRESS NOTES
[facility]  Hospitalist Progress Note  Dutch Alvares MD 06/22/2021    Reason for Stay (Diagnosis): syncope, COVID pneumonia         Assessment and Plan:      Summary of Stay: Rianna Duffy is a 70 year old female who came to attention on 6/20/2021 with chest discomfort, cough and fever.  In the ED, she was found to be COVID positive and Chest CT showed diffuse ground glass infiltrates. It sounds as though the date of onset of sx was approx 6/13.      Problem List:   1. Tussive Syncope.  Hx of chest discomfort over the course of the last couple of weeks.  Also has hx SSCP and MERCADO present for about 3 years.  Echocardiogram normal.   2. Covid pneumonia.  Not currently in distress and no hypoxia. Cough is loose-sounding. Complaining of chest pain with deep breath as well as with cough.  3. Mild hyponatremia.   4. HTN, dyslipidemia.    5. Hypothyroidism.     PLAN:  1.  Decadron and Remdesivir started at the time of admission (6/20/21) for severe COVID infection.  Last dose remdesivir expected 6/24.    2.  Activity in room as able.  Incentive spirometer.   3.  Low dose Oxycodone orally or Hydromorphone IV prn.    DVT Prophylaxis: Enoxaparin (Lovenox) SQ  Code Status: Full Code  Discharge Dispo: home expected  Estimated Disch Date / # of Days until Disch: 5 days expected        Interval History (Subjective):      Reports she is feeling poorly. Happy for good news about the echocardiogram and improving inflammatory markers. No N/V.  Eating little now, as appetite is poor. Has not been using IS as was explained by me and she is not getting up due to malaise and pain with cough.     I again communicated by iPad through an  with the patient. I also spoke with her two daughters by phone.  Both daughters speak English well                   Physical Exam:      Last Vital Signs:  /59 (BP Location: Left arm)   Pulse 63   Temp 97.7  F (36.5  C) (Oral)   Resp 20   Wt 90.7 kg (200 lb)   SpO2  94%     No intake/output data recorded.    Constitutional: Awake, alert, cooperative, no apparent distress   Respiratory: No increased WOB.  Loose-sounding cough with rales on right posterior.No wheeze.     Cardiovascular: Regular rate and rhythm, normal S1 and S2, and no murmur noted   Abdomen: Normal bowel sounds, soft, non-distended, non-tender   Skin: No rashes, no cyanosis, dry to touch   Neuro: Alert and oriented x3.     Extremities: No edema.   Other(s):        All other systems: Negative          Medications:      All current medications were reviewed with changes reflected in problem list.         Data:      All new lab and imaging data was reviewed.   Labs/Imaging:  Results for orders placed or performed during the hospital encounter of 06/20/21 (from the past 24 hour(s))   CBC with platelets   Result Value Ref Range    WBC 3.4 (L) 4.0 - 11.0 10e9/L    RBC Count 3.73 (L) 3.8 - 5.2 10e12/L    Hemoglobin 11.6 (L) 11.7 - 15.7 g/dL    Hematocrit 35.0 35.0 - 47.0 %    MCV 94 78 - 100 fl    MCH 31.1 26.5 - 33.0 pg    MCHC 33.1 31.5 - 36.5 g/dL    RDW 12.4 10.0 - 15.0 %    Platelet Count 219 150 - 450 10e9/L   Basic metabolic panel   Result Value Ref Range    Sodium 138 133 - 144 mmol/L    Potassium 3.9 3.4 - 5.3 mmol/L    Chloride 109 94 - 109 mmol/L    Carbon Dioxide 24 20 - 32 mmol/L    Anion Gap 5 3 - 14 mmol/L    Glucose 141 (H) 70 - 99 mg/dL    Urea Nitrogen 24 7 - 30 mg/dL    Creatinine 0.65 0.52 - 1.04 mg/dL    GFR Estimate 90 >60 mL/min/[1.73_m2]    GFR Estimate If Black >90 >60 mL/min/[1.73_m2]    Calcium 8.7 8.5 - 10.1 mg/dL   Fibrinogen activity   Result Value Ref Range    Fibrinogen 546 (H) 200 - 420 mg/dL   CRP inflammation   Result Value Ref Range    CRP Inflammation 70.9 (H) 0.0 - 8.0 mg/L   D dimer quantitative   Result Value Ref Range    D Dimer 0.5 0.0 - 0.50 ug/ml FEU

## 2021-06-22 NOTE — PLAN OF CARE
"Presentation/Diagnosis: Pt admitted 6/20 for episodes of LOC, coughing and fever. Covid +   History: HTN, hypothyroidism  Labs/Protocols: Na: 138  Vitals: VSS. Pain reported as \"rib pain\" from coughing.   Respiratory: LS dim in all fields. RA. Crackles noted on R side of lungs.   Neuro: Aox4.   GI/: No BM since 6/16. Stool softeners given, passing gas and bowel sounds active/audible   Skin: WDL   LDA's: L SL   Diet: Reg   Activity: SBA    Plan: Continue to monitor, plan is to discharge in 3-4 days.   " [Negative] : Heme/Lymph [Feeling Fatigued] : not feeling fatigued

## 2021-06-22 NOTE — PLAN OF CARE
This writer took over cares from [id number]. Pt A/O x 4, Icelandic-speaking;  services utilized via ipad. VSS on RA. C/O chest discomfort r/t coughing; PRN PO tessalon given. Denied CP. LS clear, MERCADO. PIV to right intact, SL. Up SBA, denies dizziness. PRN PO senna given for constipation. On IV remdesivir and decadron. Covid iso precautions maintained. Will continue POC.

## 2021-06-23 VITALS
SYSTOLIC BLOOD PRESSURE: 161 MMHG | DIASTOLIC BLOOD PRESSURE: 85 MMHG | TEMPERATURE: 98 F | RESPIRATION RATE: 18 BRPM | HEART RATE: 78 BPM | WEIGHT: 200 LBS | OXYGEN SATURATION: 96 %

## 2021-06-23 PROBLEM — J12.82 PNEUMONIA DUE TO 2019 NOVEL CORONAVIRUS: Status: ACTIVE | Noted: 2021-06-20

## 2021-06-23 PROBLEM — R05.4 TUSSIVE SYNCOPE: Status: ACTIVE | Noted: 2021-06-23

## 2021-06-23 PROBLEM — R55 TUSSIVE SYNCOPE: Status: ACTIVE | Noted: 2021-06-23

## 2021-06-23 LAB
ANION GAP SERPL CALCULATED.3IONS-SCNC: 6 MMOL/L (ref 3–14)
BUN SERPL-MCNC: 25 MG/DL (ref 7–30)
CALCIUM SERPL-MCNC: 8.4 MG/DL (ref 8.5–10.1)
CHLORIDE SERPL-SCNC: 108 MMOL/L (ref 94–109)
CO2 SERPL-SCNC: 25 MMOL/L (ref 20–32)
CREAT SERPL-MCNC: 0.64 MG/DL (ref 0.52–1.04)
CRP SERPL-MCNC: 34.1 MG/L (ref 0–8)
D DIMER PPP FEU-MCNC: 0.4 UG/ML FEU (ref 0–0.5)
ERYTHROCYTE [DISTWIDTH] IN BLOOD BY AUTOMATED COUNT: 12.4 % (ref 10–15)
GFR SERPL CREATININE-BSD FRML MDRD: 90 ML/MIN/{1.73_M2}
GLUCOSE SERPL-MCNC: 135 MG/DL (ref 70–99)
HCT VFR BLD AUTO: 35.7 % (ref 35–47)
HGB BLD-MCNC: 12 G/DL (ref 11.7–15.7)
MCH RBC QN AUTO: 31.5 PG (ref 26.5–33)
MCHC RBC AUTO-ENTMCNC: 33.6 G/DL (ref 31.5–36.5)
MCV RBC AUTO: 94 FL (ref 78–100)
PLATELET # BLD AUTO: 262 10E9/L (ref 150–450)
POTASSIUM SERPL-SCNC: 4.2 MMOL/L (ref 3.4–5.3)
RBC # BLD AUTO: 3.81 10E12/L (ref 3.8–5.2)
SODIUM SERPL-SCNC: 139 MMOL/L (ref 133–144)
WBC # BLD AUTO: 4.7 10E9/L (ref 4–11)

## 2021-06-23 PROCEDURE — 250N000013 HC RX MED GY IP 250 OP 250 PS 637: Performed by: INTERNAL MEDICINE

## 2021-06-23 PROCEDURE — 36415 COLL VENOUS BLD VENIPUNCTURE: CPT | Performed by: INTERNAL MEDICINE

## 2021-06-23 PROCEDURE — 85027 COMPLETE CBC AUTOMATED: CPT | Performed by: INTERNAL MEDICINE

## 2021-06-23 PROCEDURE — 80048 BASIC METABOLIC PNL TOTAL CA: CPT | Performed by: INTERNAL MEDICINE

## 2021-06-23 PROCEDURE — 85379 FIBRIN DEGRADATION QUANT: CPT | Performed by: INTERNAL MEDICINE

## 2021-06-23 PROCEDURE — 86140 C-REACTIVE PROTEIN: CPT | Performed by: INTERNAL MEDICINE

## 2021-06-23 PROCEDURE — 99238 HOSP IP/OBS DSCHRG MGMT 30/<: CPT | Performed by: INTERNAL MEDICINE

## 2021-06-23 RX ORDER — DEXAMETHASONE 6 MG/1
6 TABLET ORAL DAILY
Qty: 6 TABLET | Refills: 0 | Status: ON HOLD | OUTPATIENT
Start: 2021-06-23 | End: 2021-06-29

## 2021-06-23 RX ORDER — CODEINE PHOSPHATE AND GUAIFENESIN 10; 100 MG/5ML; MG/5ML
5-10 SOLUTION ORAL EVERY 6 HOURS PRN
Qty: 240 ML | Refills: 0 | Status: SHIPPED | OUTPATIENT
Start: 2021-06-23

## 2021-06-23 RX ORDER — BENZONATATE 100 MG/1
100 CAPSULE ORAL 3 TIMES DAILY PRN
Qty: 20 CAPSULE | Refills: 0 | Status: SHIPPED | OUTPATIENT
Start: 2021-06-23

## 2021-06-23 RX ADMIN — VALSARTAN 160 MG: 160 TABLET, FILM COATED ORAL at 10:14

## 2021-06-23 RX ADMIN — BENZONATATE 100 MG: 100 CAPSULE ORAL at 10:30

## 2021-06-23 RX ADMIN — ASPIRIN 81 MG: 81 TABLET ORAL at 10:14

## 2021-06-23 RX ADMIN — LEVOTHYROXINE SODIUM 100 MCG: 0.1 TABLET ORAL at 10:15

## 2021-06-23 ASSESSMENT — ACTIVITIES OF DAILY LIVING (ADL)
ADLS_ACUITY_SCORE: 18
ADLS_ACUITY_SCORE: 19
ADLS_ACUITY_SCORE: 18
ADLS_ACUITY_SCORE: 19

## 2021-06-23 NOTE — PLAN OF CARE
A/O x 4, [place]  services utilized via ipad. VSS on RA. Denied pain/CP. LS clear, MERCADO. PIV to left intact, SL. Up SBA, good UOP. Reported one small BM this AM. PRN PO tessalon given per pt request for cough. Maintained covid iso precations. Adequate for discharge today, home w/ daughter. Will continue POC.

## 2021-06-23 NOTE — PROGRESS NOTES
Patient alert and oriented.  Up with SBA.  Denies pain.  Lung sounds dim/clear. Dyspnea on exertion. 02 92-93% on RA. Bowel sounds audible. Will continue to monitor per plan of care.

## 2021-06-23 NOTE — DISCHARGE SUMMARY
[facility] Discharge Summary    Rianna Duffy MRN# [id number]   Age: 70 year old YOB: 1950     Date of Admission:  6/20/2021  Date of Discharge::  6/23/2021  Admitting Physician:  Jack Mccoy DO  Discharge Physician:  Dutch Alvares MD     Home clinic: [facility]          Admission Diagnoses:   Hyponatremia [E87.1]  Syncope, unspecified syncope type [R55]  2019 novel coronavirus disease (COVID-19) [U07.1]          Discharge Diagnosis:   Principal Problem:    Pneumonia due to 2019 novel coronavirus  Active Problems:    Hyponatremia    Tussive syncope            Procedures:   Echocardiogram  Telemetry monitoring.=          Medications Prior to Admission:     Medications Prior to Admission   Medication Sig Dispense Refill Last Dose     acetaminophen (TYLENOL) 325 MG tablet Take 650 mg by mouth every 6 hours as needed    6/20/2021 at am     aspirin 81 MG EC tablet Take 81 mg by mouth daily   6/20/2021 at am     levothyroxine (SYNTHROID/LEVOTHROID) 100 MCG tablet Take 100 mcg by mouth daily   6/20/2021 at am     valsartan (DIOVAN) 160 MG tablet Take 160 mg by mouth daily   6/20/2021 at am             Discharge Medications:     Current Discharge Medication List      START taking these medications    Details   benzonatate (TESSALON) 100 MG capsule Take 1 capsule (100 mg) by mouth 3 times daily as needed for cough  Qty: 20 capsule, Refills: 0    Associated Diagnoses: Pneumonia due to 2019 novel coronavirus      dexamethasone (DECADRON) 6 MG tablet Take 1 tablet (6 mg) by mouth daily for 6 days  Qty: 6 tablet, Refills: 0    Associated Diagnoses: Pneumonia due to 2019 novel coronavirus      guaiFENesin-codeine (ROBITUSSIN AC) 100-10 MG/5ML solution Take 5-10 mLs by mouth every 6 hours as needed for cough  Qty: 240 mL, Refills: 0    Associated Diagnoses: Pneumonia due to 2019 novel coronavirus         CONTINUE these medications which have NOT CHANGED    Details    acetaminophen (TYLENOL) 325 MG tablet Take 650 mg by mouth every 6 hours as needed       aspirin 81 MG EC tablet Take 81 mg by mouth daily      levothyroxine (SYNTHROID/LEVOTHROID) 100 MCG tablet Take 100 mcg by mouth daily      valsartan (DIOVAN) 160 MG tablet Take 160 mg by mouth daily                   Consultations:   No consultations were requested during this admission          Hospital Course:   Rianna Duffy is a 70 year old female who came to attention on 6/20/2021 with chest discomfort, cough and fever.  She had evidently had an episode of LOC at home associated with a coughing jag.    In the ED, Ms. Duffy was found to be COVID positive and Chest CT showed diffuse ground glass infiltrates. It sounds as though the date of onset of sx was approx 6/13. She notably was not hypoxic despite significant tachypnea.      The patient was admitted to a medical bed and remained hemodynamically stable. She was empirically started on decadron 6 mg and remdesivir (5 days).      Although she felt quite ill initially and even until yesterday, she was feeling very well today, the date of discharge.  She was feeling that if she were to discharge ome, she would actually feel better.  I concurred, so we arranged for her discharge.    BP (!) 150/81 (BP Location: Left arm)   Pulse 79   Temp 97.6  F (36.4  C) (Oral)   Resp 22   Wt 90.7 kg (200 lb)   SpO2 96%   On the date of discharge, Ms. Duffy was alert, coherent and in NAD. She speaks a little English, but I communicated with her by  by phone. She is bothered by chest pain when she cougs.  HEENT: no focal muscular asymmetry.  Chest: Loose sounding rales, post on R > L.          Discharge Instructions and Follow-Up:   Discharge diet: Regular   Discharge activity: Activity as tolerated   Discharge follow-up: As needed           Discharge Disposition:   Discharged to home      Attestation:  I have reviewed today's vital signs, notes, medications, labs  and imaging.  Total time: 25 minutes    Dutch Alvares MD

## 2021-06-23 NOTE — PLAN OF CARE
Admitting Diagnosis:Syncope / Covid+   Pertinent History: HTN, dyslipidmia, hypothyroidism, mild hyponatremia.  For vital and assessment please see flow sheet.   Living Situation: home with family.  Pain plan: C/o ribs pain. declined medication.  Mobility: assistance, stand-by  Baseline activity: Indipendent.   Alarms/Safety: BA  LDA's:PIV on Left arm.   Pertinent test results: CR: 0.65, K: 3.9, PLT: 219, hgb: 11.6.  Consults: none   Abnormals/Pending: none   Other Cares/Comments:  & O x4, VSS, LS  diminished, o2 94% RA. Abx given. C/o  Head Ache Tylenol given.  Discharge Disposition: TBD  Discharge Time:TBD

## 2021-06-23 NOTE — DISCHARGE SUMMARY
"AVS reviewed with pt via [place]  services on ipad. All questions answered. Pt denies any further questions or concerns. PIV removed, no complications. All belongings returned including two filled prescriptions (pt informed refusal PO decadron; risks of not taking educated to pt via  services and verbalized understanding, still refused stating, \"it makes me feel bad after, I will not take it.\" Pt escorted to front door via WC by [place] staff, home w/ daughter.    "

## 2021-06-26 ENCOUNTER — HOSPITAL ENCOUNTER (INPATIENT)
Facility: CLINIC | Age: 71
LOS: 3 days | Discharge: HOME | DRG: 177 | End: 2021-06-29
Attending: EMERGENCY MEDICINE | Admitting: INTERNAL MEDICINE
Payer: COMMERCIAL

## 2021-06-26 ENCOUNTER — APPOINTMENT (OUTPATIENT)
Dept: GENERAL RADIOLOGY | Facility: CLINIC | Age: 71
DRG: 177 | End: 2021-06-26
Attending: EMERGENCY MEDICINE
Payer: COMMERCIAL

## 2021-06-26 ENCOUNTER — APPOINTMENT (OUTPATIENT)
Dept: CT IMAGING | Facility: CLINIC | Age: 71
DRG: 177 | End: 2021-06-26
Attending: INTERNAL MEDICINE
Payer: COMMERCIAL

## 2021-06-26 DIAGNOSIS — U07.1 CLINICAL DIAGNOSIS OF COVID-19: ICD-10-CM

## 2021-06-26 DIAGNOSIS — I10 HYPERTENSION, UNSPECIFIED TYPE: ICD-10-CM

## 2021-06-26 DIAGNOSIS — R09.02 HYPOXIA: ICD-10-CM

## 2021-06-26 LAB
ANION GAP SERPL CALCULATED.3IONS-SCNC: 8 MMOL/L (ref 3–14)
BACTERIA SPEC CULT: NO GROWTH
BACTERIA SPEC CULT: NO GROWTH
BASOPHILS # BLD AUTO: 0 10E9/L (ref 0–0.2)
BASOPHILS NFR BLD AUTO: 0.1 %
BUN SERPL-MCNC: 8 MG/DL (ref 7–30)
CALCIUM SERPL-MCNC: 8.7 MG/DL (ref 8.5–10.1)
CHLORIDE SERPL-SCNC: 103 MMOL/L (ref 94–109)
CO2 SERPL-SCNC: 24 MMOL/L (ref 20–32)
CREAT SERPL-MCNC: 0.59 MG/DL (ref 0.52–1.04)
CREAT SERPL-MCNC: 0.61 MG/DL (ref 0.52–1.04)
DIFFERENTIAL METHOD BLD: NORMAL
EOSINOPHIL # BLD AUTO: 0.2 10E9/L (ref 0–0.7)
EOSINOPHIL NFR BLD AUTO: 1.9 %
ERYTHROCYTE [DISTWIDTH] IN BLOOD BY AUTOMATED COUNT: 12.4 % (ref 10–15)
GFR SERPL CREATININE-BSD FRML MDRD: >90 ML/MIN/{1.73_M2}
GFR SERPL CREATININE-BSD FRML MDRD: >90 ML/MIN/{1.73_M2}
GLUCOSE SERPL-MCNC: 120 MG/DL (ref 70–99)
HCT VFR BLD AUTO: 36.1 % (ref 35–47)
HGB BLD-MCNC: 12.2 G/DL (ref 11.7–15.7)
IMM GRANULOCYTES # BLD: 0.1 10E9/L (ref 0–0.4)
IMM GRANULOCYTES NFR BLD: 1.7 %
LACTATE BLD-SCNC: 1.4 MMOL/L (ref 0.7–2)
LYMPHOCYTES # BLD AUTO: 1.1 10E9/L (ref 0.8–5.3)
LYMPHOCYTES NFR BLD AUTO: 14.2 %
Lab: NORMAL
MCH RBC QN AUTO: 31.4 PG (ref 26.5–33)
MCHC RBC AUTO-ENTMCNC: 33.8 G/DL (ref 31.5–36.5)
MCV RBC AUTO: 93 FL (ref 78–100)
MONOCYTES # BLD AUTO: 0.4 10E9/L (ref 0–1.3)
MONOCYTES NFR BLD AUTO: 5.2 %
NEUTROPHILS # BLD AUTO: 6 10E9/L (ref 1.6–8.3)
NEUTROPHILS NFR BLD AUTO: 76.9 %
NRBC # BLD AUTO: 0 10*3/UL
NRBC BLD AUTO-RTO: 0 /100
NT-PROBNP SERPL-MCNC: 209 PG/ML (ref 0–900)
PLATELET # BLD AUTO: 326 10E9/L (ref 150–450)
POTASSIUM SERPL-SCNC: 3.4 MMOL/L (ref 3.4–5.3)
RBC # BLD AUTO: 3.89 10E12/L (ref 3.8–5.2)
SODIUM SERPL-SCNC: 135 MMOL/L (ref 133–144)
SPECIMEN SOURCE: NORMAL
SPECIMEN SOURCE: NORMAL
TROPONIN I SERPL-MCNC: <0.015 UG/L (ref 0–0.04)
WBC # BLD AUTO: 7.8 10E9/L (ref 4–11)

## 2021-06-26 PROCEDURE — 84484 ASSAY OF TROPONIN QUANT: CPT | Performed by: EMERGENCY MEDICINE

## 2021-06-26 PROCEDURE — 250N000013 HC RX MED GY IP 250 OP 250 PS 637: Performed by: INTERNAL MEDICINE

## 2021-06-26 PROCEDURE — 96361 HYDRATE IV INFUSION ADD-ON: CPT

## 2021-06-26 PROCEDURE — 85025 COMPLETE CBC W/AUTO DIFF WBC: CPT | Performed by: EMERGENCY MEDICINE

## 2021-06-26 PROCEDURE — 96374 THER/PROPH/DIAG INJ IV PUSH: CPT

## 2021-06-26 PROCEDURE — 250N000011 HC RX IP 250 OP 636: Performed by: INTERNAL MEDICINE

## 2021-06-26 PROCEDURE — 84484 ASSAY OF TROPONIN QUANT: CPT | Mod: 91 | Performed by: INTERNAL MEDICINE

## 2021-06-26 PROCEDURE — 99285 EMERGENCY DEPT VISIT HI MDM: CPT | Mod: 25

## 2021-06-26 PROCEDURE — 83880 ASSAY OF NATRIURETIC PEPTIDE: CPT | Performed by: EMERGENCY MEDICINE

## 2021-06-26 PROCEDURE — 71275 CT ANGIOGRAPHY CHEST: CPT

## 2021-06-26 PROCEDURE — 82565 ASSAY OF CREATININE: CPT | Performed by: INTERNAL MEDICINE

## 2021-06-26 PROCEDURE — 93005 ELECTROCARDIOGRAM TRACING: CPT | Mod: 76

## 2021-06-26 PROCEDURE — 36415 COLL VENOUS BLD VENIPUNCTURE: CPT | Performed by: EMERGENCY MEDICINE

## 2021-06-26 PROCEDURE — 93005 ELECTROCARDIOGRAM TRACING: CPT

## 2021-06-26 PROCEDURE — 99223 1ST HOSP IP/OBS HIGH 75: CPT | Mod: AI | Performed by: INTERNAL MEDICINE

## 2021-06-26 PROCEDURE — 87040 BLOOD CULTURE FOR BACTERIA: CPT | Performed by: EMERGENCY MEDICINE

## 2021-06-26 PROCEDURE — 71045 X-RAY EXAM CHEST 1 VIEW: CPT

## 2021-06-26 PROCEDURE — 258N000003 HC RX IP 258 OP 636: Performed by: EMERGENCY MEDICINE

## 2021-06-26 PROCEDURE — 80048 BASIC METABOLIC PNL TOTAL CA: CPT | Performed by: EMERGENCY MEDICINE

## 2021-06-26 PROCEDURE — 96375 TX/PRO/DX INJ NEW DRUG ADDON: CPT

## 2021-06-26 PROCEDURE — 120N000001 HC R&B MED SURG/OB

## 2021-06-26 PROCEDURE — 83605 ASSAY OF LACTIC ACID: CPT | Performed by: EMERGENCY MEDICINE

## 2021-06-26 PROCEDURE — 36415 COLL VENOUS BLD VENIPUNCTURE: CPT | Performed by: INTERNAL MEDICINE

## 2021-06-26 PROCEDURE — 250N000011 HC RX IP 250 OP 636: Performed by: EMERGENCY MEDICINE

## 2021-06-26 RX ORDER — VALSARTAN 160 MG/1
160 TABLET ORAL DAILY
Status: DISCONTINUED | OUTPATIENT
Start: 2021-06-26 | End: 2021-06-29 | Stop reason: HOSPADM

## 2021-06-26 RX ORDER — DEXAMETHASONE SODIUM PHOSPHATE 10 MG/ML
6 INJECTION, SOLUTION INTRAMUSCULAR; INTRAVENOUS ONCE
Status: COMPLETED | OUTPATIENT
Start: 2021-06-26 | End: 2021-06-26

## 2021-06-26 RX ORDER — BISACODYL 5 MG
10 TABLET, DELAYED RELEASE (ENTERIC COATED) ORAL DAILY PRN
Status: DISCONTINUED | OUTPATIENT
Start: 2021-06-26 | End: 2021-06-29 | Stop reason: HOSPADM

## 2021-06-26 RX ORDER — ONDANSETRON 2 MG/ML
4 INJECTION INTRAMUSCULAR; INTRAVENOUS ONCE
Status: COMPLETED | OUTPATIENT
Start: 2021-06-26 | End: 2021-06-26

## 2021-06-26 RX ORDER — LORAZEPAM 2 MG/ML
0.5 INJECTION INTRAMUSCULAR ONCE
Status: COMPLETED | OUTPATIENT
Start: 2021-06-26 | End: 2021-06-26

## 2021-06-26 RX ORDER — METOPROLOL TARTRATE 25 MG/1
25 TABLET, FILM COATED ORAL 2 TIMES DAILY
Status: DISCONTINUED | OUTPATIENT
Start: 2021-06-26 | End: 2021-06-26

## 2021-06-26 RX ORDER — LABETALOL HYDROCHLORIDE 5 MG/ML
10 INJECTION, SOLUTION INTRAVENOUS ONCE
Status: COMPLETED | OUTPATIENT
Start: 2021-06-26 | End: 2021-06-26

## 2021-06-26 RX ORDER — LABETALOL HYDROCHLORIDE 5 MG/ML
10 INJECTION, SOLUTION INTRAVENOUS ONCE
Status: DISCONTINUED | OUTPATIENT
Start: 2021-06-26 | End: 2021-06-26

## 2021-06-26 RX ORDER — ACETAMINOPHEN 325 MG/1
650 TABLET ORAL EVERY 4 HOURS PRN
Status: DISCONTINUED | OUTPATIENT
Start: 2021-06-26 | End: 2021-06-29 | Stop reason: HOSPADM

## 2021-06-26 RX ORDER — LABETALOL HYDROCHLORIDE 5 MG/ML
20 INJECTION, SOLUTION INTRAVENOUS ONCE
Status: DISCONTINUED | OUTPATIENT
Start: 2021-06-26 | End: 2021-06-26

## 2021-06-26 RX ORDER — IOPAMIDOL 755 MG/ML
70 INJECTION, SOLUTION INTRAVASCULAR ONCE
Status: COMPLETED | OUTPATIENT
Start: 2021-06-26 | End: 2021-06-26

## 2021-06-26 RX ORDER — METHYLPREDNISOLONE 4 MG
1 TABLET, DOSE PACK ORAL DAILY PRN
COMMUNITY

## 2021-06-26 RX ORDER — GUAIFENESIN/DEXTROMETHORPHAN 100-10MG/5
5 SYRUP ORAL EVERY 4 HOURS PRN
Status: DISCONTINUED | OUTPATIENT
Start: 2021-06-26 | End: 2021-06-29 | Stop reason: HOSPADM

## 2021-06-26 RX ORDER — BISACODYL 5 MG
5 TABLET, DELAYED RELEASE (ENTERIC COATED) ORAL DAILY PRN
Status: DISCONTINUED | OUTPATIENT
Start: 2021-06-26 | End: 2021-06-29 | Stop reason: HOSPADM

## 2021-06-26 RX ORDER — BISACODYL 5 MG
15 TABLET, DELAYED RELEASE (ENTERIC COATED) ORAL DAILY PRN
Status: DISCONTINUED | OUTPATIENT
Start: 2021-06-26 | End: 2021-06-29 | Stop reason: HOSPADM

## 2021-06-26 RX ORDER — ONDANSETRON 2 MG/ML
4 INJECTION INTRAMUSCULAR; INTRAVENOUS EVERY 6 HOURS PRN
Status: DISCONTINUED | OUTPATIENT
Start: 2021-06-26 | End: 2021-06-29 | Stop reason: HOSPADM

## 2021-06-26 RX ORDER — LIDOCAINE 40 MG/G
CREAM TOPICAL
Status: DISCONTINUED | OUTPATIENT
Start: 2021-06-26 | End: 2021-06-29 | Stop reason: HOSPADM

## 2021-06-26 RX ORDER — ONDANSETRON 4 MG/1
4 TABLET, ORALLY DISINTEGRATING ORAL EVERY 6 HOURS PRN
Status: DISCONTINUED | OUTPATIENT
Start: 2021-06-26 | End: 2021-06-29 | Stop reason: HOSPADM

## 2021-06-26 RX ADMIN — Medication 12.5 MG: at 20:10

## 2021-06-26 RX ADMIN — ENOXAPARIN SODIUM 40 MG: 40 INJECTION SUBCUTANEOUS at 11:24

## 2021-06-26 RX ADMIN — LORAZEPAM 0.5 MG: 2 INJECTION INTRAMUSCULAR; INTRAVENOUS at 06:25

## 2021-06-26 RX ADMIN — METOPROLOL TARTRATE 25 MG: 25 TABLET, FILM COATED ORAL at 11:24

## 2021-06-26 RX ADMIN — ONDANSETRON 4 MG: 2 INJECTION INTRAMUSCULAR; INTRAVENOUS at 06:25

## 2021-06-26 RX ADMIN — GUAIFENESIN AND DEXTROMETHORPHAN 5 ML: 100; 10 SYRUP ORAL at 21:43

## 2021-06-26 RX ADMIN — DEXAMETHASONE SODIUM PHOSPHATE 6 MG: 10 INJECTION, SOLUTION INTRAMUSCULAR; INTRAVENOUS at 06:08

## 2021-06-26 RX ADMIN — BISACODYL 5 MG: 5 TABLET, COATED ORAL at 21:43

## 2021-06-26 RX ADMIN — LABETALOL HYDROCHLORIDE 10 MG: 5 INJECTION, SOLUTION INTRAVENOUS at 06:25

## 2021-06-26 RX ADMIN — SODIUM CHLORIDE 1000 ML: 9 INJECTION, SOLUTION INTRAVENOUS at 02:06

## 2021-06-26 RX ADMIN — IOPAMIDOL 70 ML: 755 INJECTION, SOLUTION INTRAVENOUS at 10:07

## 2021-06-26 ASSESSMENT — ENCOUNTER SYMPTOMS
COUGH: 1
APPETITE CHANGE: 1

## 2021-06-26 ASSESSMENT — ACTIVITIES OF DAILY LIVING (ADL)
ADLS_ACUITY_SCORE: 19
ADLS_ACUITY_SCORE: 19
ADLS_ACUITY_SCORE: 20

## 2021-06-26 ASSESSMENT — MIFFLIN-ST. JEOR: SCORE: 1394.05

## 2021-06-26 NOTE — ED NOTES
[facility]  ED Nurse Handoff Report    Rianna Duffy is a 70 year old female   ED Chief complaint: Covid Concern  . ED Diagnosis:   Final diagnoses:   Clinical diagnosis of COVID-19   Hypoxia   Hypertension, unspecified type     Allergies: No Known Allergies    Code Status: Full Code  Activity level - Baseline/Home:  Independent. Activity Level - Current:   Stand by Assist. Lift room needed: No. Bariatric: No   Needed: Yes   Isolation: Yes. Infection: Not Applicable  COVID r/o and special precautions.     Vital Signs:   Vitals:    06/26/21 0530 06/26/21 0545 06/26/21 0600 06/26/21 0612   BP: (!) 160/91 (!) 196/106 (!) 166/98    Pulse: 75 96 84    Resp: 21 18 23    Temp:    98.5  F (36.9  C)   TempSrc:    Oral   SpO2: 94% 95% 94%        Cardiac Rhythm:  ,      Pain level:    Patient confused: No. Patient Falls Risk: Yes.   Elimination Status: Has voided   Patient Report - Initial Complaint: SOB, fatigue. Focused Assessment: LS coarse, nauseated, HTN, covid +   Tests Performed:   Labs Ordered and Resulted from Time of ED Arrival Up to the Time of Departure from the ED   BASIC METABOLIC PANEL - Abnormal; Notable for the following components:       Result Value    Glucose 120 (*)     All other components within normal limits   CBC WITH PLATELETS DIFFERENTIAL   LACTIC ACID WHOLE BLOOD   NT PROBNP INPATIENT   TROPONIN I   TROPONIN I   BLOOD CULTURE   BLOOD CULTURE     . Abnormal Results:   XR Chest Port 1 View   Final Result   IMPRESSION: Bilateral multifocal pneumonia.        .   Treatments provided: EKGs, labs, decadron, labetalol, 2L NC  Family Comments: Daughter helped translate via phone  OBS brochure/video discussed/provided to patient:  Yes  ED Medications:   Medications   labetalol (NORMODYNE/TRANDATE) injection 10 mg (10 mg Intravenous Not Given 6/26/21 0610)   labetalol (NORMODYNE/TRANDATE) injection 20 mg (has no administration in time range)   0.9% sodium chloride BOLUS (0 mLs  Intravenous Stopped 6/26/21 0442)   dexamethasone PF (DECADRON) injection 6 mg (6 mg Intravenous Given 6/26/21 0608)     Drips infusing:  No  For the majority of the shift, the patient's behavior Green. Interventions performed were N/A.    Sepsis treatment initiated: No     Patient tested for COVID 19 prior to admission: NO - confirmed positive    ED Nurse Name/Phone Number: Sylvie Hamm RN,   6:16 AM    RECEIVING UNIT ED HANDOFF REVIEW    Above ED Nurse Handoff Report was reviewed: Yes  Reviewed by: Ashia Johnson RN on June 26, 2021 at 8:08 AM

## 2021-06-26 NOTE — PHARMACY-ADMISSION MEDICATION HISTORY
Admission medication history interview status for this patient is complete. See [facility] admission navigator for allergy information, prior to admission medications and immunization status.     Medication history interview done, indicate source(s): Patient and Family (patient via Tristanian , daughter Destinee via phone)   Medication history resources (including written lists, pill bottles, clinic record): fill history, discharge summary 6/23/21  Pharmacy: will need to specify with patient at discharge    Changes made to PTA medication list:  Added: glucosamine  Deleted: none  Changed: none    Actions taken by pharmacist (provider contacted, etc):None     Additional medication history information: Between daughter and patient, received somewhat reliable information. Daughter stated patient was taking aspirin on PRN basis; patient stated running out 2 weeks ago but taking daily as prescribed before this. Both family and patient stated patient did not take dexamethasone as of last discharge, unclear whether any doses were taken between discharge and return to hospital. Cardiology note from 5/2021 showed atorvastatin use; daughter stated patient may be taking, but patient stated she stopped taking when her cholesterol labs normalized. Daughter stated patient was taking glucosamine, patient initially stated running out 3 weeks ago, but later in interview stated taking daily. Added to PTA med list; it seems patient takes at least when supply is available.     Medication reconciliation/reorder completed by provider prior to medication history?  Y      Prior to Admission medications    Medication Sig Last Dose Taking? Auth Provider   acetaminophen (TYLENOL) 325 MG tablet Take 650 mg by mouth every 6 hours as needed  6/25/2021 at Unknown time Yes Reported, Patient   aspirin 81 MG EC tablet Take 81 mg by mouth daily Past Month at 2 weeks ago Yes Unknown, Entered By History   benzonatate (TESSALON) 100 MG capsule Take 1  capsule (100 mg) by mouth 3 times daily as needed for cough Past Week at Unknown time Yes Dutch Alvares MD   Glucosamine Sulfate 500 MG TABS Take 1 tablet by mouth daily as needed Past Month at Unknown time Yes Unknown, Entered By History   guaiFENesin-codeine (ROBITUSSIN AC) 100-10 MG/5ML solution Take 5-10 mLs by mouth every 6 hours as needed for cough Past Week at Unknown time Yes Dutch Alvares MD   levothyroxine (SYNTHROID/LEVOTHROID) 100 MCG tablet Take 100 mcg by mouth daily 6/24/2021 at AM Yes Unknown, Entered By History   valsartan (DIOVAN) 160 MG tablet Take 160 mg by mouth daily 6/25/2021 at 2 doses taken Yes Unknown, Entered By History   dexamethasone (DECADRON) 6 MG tablet Take 1 tablet (6 mg) by mouth daily for 6 days  at Not taking  Dutch Alvares MD Melanie Breuer  PGY-1 Pharmacy Practice Resident

## 2021-06-26 NOTE — PROGRESS NOTES
Patient admitted by my colleague this AM.  She has not had any major new issues arise since admission up to the floor.  Her RN thinks she is doing better.  BP has improved after AM metoprolol.  Given her marked response to 25 mg I backed off the BID dosing to 12.5 mg.  ARB to resume tomorrow AM.  She is now about 2 weeks out from initial COVID symptoms.  Continue decadron given borderline hypoxia.  She did not have the full 5 days of Remdesivir last admission but most of it.  As she doesn't have major hypoxia, is 2 weeks out from initial symptoms, and already had majority of 5 days of remedesivir I would hold on retreating with that.  No recent chest pain by report.  Continue to monitor.  Ultimately could consider stress test but unless more overt cardiac issue would recommend waiting until after acute COVID resolves.

## 2021-06-26 NOTE — H&P
Admitted: 06/26/2021    PRESENTING COMPLAINT:  Shortness of breath, chest pressure and hypertension.    HISTORY OF PRESENT ILLNESS:  Ms. Duffy is a 70-year-old [place]-speaking female who has a past medical history significant for hypertension, and hypothyroidism.  She was actually hospitalized at this facility from 06/20/2021 through 06/23/2021 for syncopal spells.  She presented for syncope and family was under the impression that it was related to uncontrolled high blood pressure at home.  During that admission, she had an echocardiogram, which was within normal limits with an ejection fraction of 60%-65% and normal global systolic function.  Ultimately, it was felt that the syncopal spells were probably precipitated by coughing and at discharge, she was just maintained on her typical antihypertensive of valsartan at 160 mg a day.    At that hospitalization, she was also found to be COVID positive.  She had a chest CT showing diffuse ground-glass infiltrates as well.  Date of onset of symptoms was 06/13, approximately.  During her hospitalization, she was tachypneic, but she was never hypoxic.  At that hospitalization, she was treated with Decadron and remdesivir and actually discharged to complete her course of dexamethasone.    She has brought in here because of uncontrolled hypertension.  Her blood pressures have been varying between 150-190 range at home.  She has had episodes where she has felt nauseated and weak.  She also states that she has had a 2-week history of off again, on again chest pain that she describes as a chest pressure.  She also feels incredibly hot with it and it is difficult for her to breathe.  She is feeling that way during my interview and examination with her in the Emergency Room and at that time, her blood pressure is 196/98.  She appeared slightly diaphoretic as well.    She seemed to calm down, her blood pressure actually came down and then she stated that she was feeling  nauseated, but it seems like that kind of intense chest pressure appeared to have passed.    In the Emergency Room, her evaluation is notable for persistent hypertension in the 160s-190s over 90s-100.  She is afebrile, heart rates are in the 80s.  Her sats are actually 90% on room air and they are 94% on 2 liters by nasal cannula.  Laboratory evaluation: Basic metabolic panel is normal.  Her BNP is normal.  Her troponin is undetectable.  Her EKG to my personal read looks like straight up normal sinus rhythm.  There is no ST-T changes at all.  She has a mildly elevated glucose of 120.  CBC is within normal limits.  Blood cultures are negative.  Chest x-ray is completed and it just showed and continues to show bilateral infiltrates.    The history is obtained in discussion with the Emergency Room physician, Dr. Cabrera as well as a patient and her daughter, Jelena.  Jelena also offers background information and serves as an  for me.    PAST MEDICAL HISTORY:     1.  COVID pneumonia diagnosed in 06/2021.  2.  Hypertension.  3.  Hypothyroidism.  4.  Hyperglycemia without diabetes    FAMILY HISTORY:  Reviewed and noncontributory to this hospitalization.    SOCIAL HISTORY:  She is a never smoker and does not use alcohol.    CODE STATUS:  Full.    Medications - awaiting formal medication reconciliation but per prior discharge summary is on levothyroxine replacement, she should have completed the dexamethasone by now.     Allergies:  NKDA    REVIEW OF SYSTEMS:  A 10-point review of systems is completed and negative, except for as listed in the HPI.    PHYSICAL EXAMINATION:    VITAL SIGNS:  Blood pressure is currently are 166/98, heart rate is 84, temperature is 98.5, and respiratory rate is 18-23, sats are 94-95% on 2 liters by nasal cannula.  GENERAL:  Initially when I was in the room, she looked quite uncomfortable.  She was diaphoretic and was tachypneic.  She actually is much improved, just, I think, with  improvement in her blood pressure.  She looks her stated age.  HEENT:  Normocephalic, atraumatic.  Her sclerae are clear.  LUNGS:  Lung sounds are distant and have fine crackles to them.  I do not appreciate any wheezing.  CARDIOVASCULAR:  Heart is distant as well and regular.  I do not appreciate any murmurs, rubs or gallops and there is no lower extremity edema.  ABDOMEN:  Belly is obese, it is soft and nontender, but when I push on epigastric region, I do make her more nauseous.  Skin is slightly diaphoretic, but that is actually much improved compared to when I first met her.   NEUROLOGIC:    Affect is mildly anxious, but she is alert and oriented.  Cranial nerves are grossly intact and she is moving all extremities.    LABORATORY EVALUATION:  As described in the HPI.    IMPRESSION AND PLAN:  Ms. Duffy is a 70-year-old Swazi-speaking female with past medical history of hypertension and hypothyroidism.  hospitalized at this facility from 06/20/2021-06/23/2021 with multiple syncopal events and found to have COVID pneumonia without significant hypoxia.  She was treated with remdesivir and dexamethasone at that time and did not require oxygen at discharge.  She returns for evaluation for uncontrolled hypertension at home with episodic chest pressure, shortness of breath, diaphoresis, as well as nausea and poor p.o. intake.    I am admitting Ms. Duffy for mild hypoxia, likely in association with COVID pneumonia as well as uncontrolled hypertension and evidence of end organ damage with shortness of breath and chest pressure/pain.  1.  Acute hypoxic respiratory failure, saturations were in the 88-90% range per my conversation with Dr. Cabrera.  Continue with supplemental oxygen.  Her BNP is within normal limits, so I do not feel that this is related to pulmonary edema.  She is certainly at risk for evolution of a pulmonary embolism or deep venous thrombosis in the setting of an acute COVID pneumonia, I do note  that she is on aspirin daily.  Given her symptomatology and kind of late onset of hypoxia, I think it is reasonable to get a CT PE protocol to make sure she does not have a blood clot.  2.  Hypertension, looks just to be on valsartan 160 mg per day.  I talked to the daughter, it sounds like she actually took an extra tablet last night and her blood pressure is still extremely high. I am going to add a low dose of metoprolol at 25 b.i.d. and we can assess her response to that.  3.  Chest pressure.  I am concerned that it is just related to her hypertension.  Her EKG actually looks quite good.  I did request an additional troponin be checked in the Emergency Room and that will need to get followed up.  In the meantime, we will keep her on telemetry and we will do one more troponin to finish out a trend of 3.  If all negative, I think it would be reasonable to treat the blood pressure, but she probably needs some type of stress testing based on age and risk factors in the near future.  4.  Mildly elevated glucose.  I think it is stress related.  5.  Hypothyroidism.  Resume her home replacement therapy once known.  6.  Deep venous thrombosis prophylaxis.  We will use enoxaparin.  7.  Code status:  Full.  8.  Disposition:  Admit inpatient for greater than expected 2 midnight stay.    Amberly Pichardo MD        D: 2021   T: 2021   MT: [id number]    Name:     CHELA ERWIN  MRN:      [id number]        Account:     [id number]   :      1950           Admitted:    2021       Document: [id number]    cc:  Emily Forman MD

## 2021-06-26 NOTE — ED PROVIDER NOTES
History   Chief Complaint:  Covid Concern       The history is provided by the patient.      Rianna Duffy is a 70 year old female with history of COVID-19 and pneumonia who presents with worsening cough, decreased appetite and hypertension. She tested positive for COVID-19 six days ago on 6/20.     Review of Systems   Constitutional: Positive for appetite change.   Respiratory: Positive for cough.    All other systems reviewed and are negative.      Allergies:  Lisinopril  Losartan    Medications:  Aspirin 81 mg  Tessalon  Decadron  Robitussin AC  Synthroid  Diovan    Past Medical History:    Tussive syncope  Hyponatremia  Pneumonia  Anemia  Hypertension  Osteopenia  Hypothyroidism  Obesity     Past Surgical History:    Knee surgery  Carpal tunnel release  GYN surgery    Social History:  Presents to ED    Physical Exam     Patient Vitals for the past 24 hrs:   BP Temp Temp src Pulse Resp SpO2   06/26/21 0059 (!) 187/99 -- -- -- -- --   06/26/21 0058 -- 99  F (37.2  C) Temporal 81 16 90 %       Physical Exam  Constitutional:  Oriented to person, place, and time.   HENT:   Head:    Normocephalic.   Mouth/Throat:   Oropharynx is clear and moist.   Eyes:    EOM are normal. Pupils are equal, round, and reactive to light.   Neck:    Neck supple.   Cardiovascular:  Normal rate, regular rhythm and normal heart sounds.      Exam reveals no gallop and no friction rub.       No murmur heard.  Pulmonary/Chest:  Effort normal and breath sounds normal.      No respiratory distress. No wheezes. No rales.      No reproducible chest wall pain.  Abdominal:   Soft. No distension. No tenderness. No rebound and no guarding.   Musculoskeletal:  Normal range of motion.   Neurological:   Alert and oriented to person, place, and time.           Moves all 4 extremities spontaneously    Skin:    No rash noted. No pallor.       Emergency Department Course   ECG  ECG taken at 0200, ECG read at 0202  Normal sinus rhythm  Normal ECG   Rate  81 bpm. MO interval 158 ms. QRS duration 80 ms. QT/QTc 388/450 ms. P-R-T axes 44 -8 73.     Imaging:  XR Chest Port 1 View  Bilateral multifocal pneumonia.  As per radiology.     Laboratory:  Blood culture x2: pending  CBC: WBC 7.8, HGB 12.2,   BMP: Glucose: 120(H) o/w WNL (Creatinine 0.59)  Lactic Acid: 1.4  Nt probnp inpatient: 209  Troponin (Collected 0113): <0.015  Troponin (Collected 0623): <0.015  Blood culture x2: pending      Emergency Department Course:    Reviewed:  I reviewed nursing notes, vitals, past medical history and care everywhere    Assessments:  0143 I obtained history and examined the patient as noted above.     Consults:   6379 I spoke with  from the hospitalist service regarding patient's presentation, findings, and plan of care.     Interventions:  0206 NS, 1L, IV  0608 Decadron, 6 mg, IV  0625 Normodyne, 10 mg, IV  0625 Ativan, 0.5 mg, IV    Disposition:  The patient was admitted to the hospital under the care of Dr. Pichardo.       Impression & Plan       Medical Decision Making:  Medical decision making for Ms. Duffy 70 year old female that came in for concern for hypertension. She had recent admission for syncope and COVID. Per patient she is just under two weeks of symptoms. She has been gnerally weak with continued cough and fever. Was unaware that she was having low oxygen readings. Fortunately her pulse ox was in the upper 80 to low 90s consistently here requiring supplemental oxygen. In regard to her hypertension I did check for end organ damage which was otherwise reassuring. In regards to her hypoxia this is clearly secondary due to COVID with worsening  on her chest x-ray. As she requires supplemental oxygen she will require admission in regards to this. Decadron has been started patient will be admitted for further monitoring.       Covid-19  Rianna Duffy was evaluated during a global COVID-19 pandemic, which necessitated consideration that the patient might be  at risk for infection with the SARS-CoV-2 virus that causes COVID-19.   Applicable protocols for evaluation were followed during the patient's care.       Diagnosis:    ICD-10-CM    1. Clinical diagnosis of COVID-19  U07.1 Blood culture     Blood culture     Troponin I (now)     Troponin I     Creatinine   2. Hypoxia  R09.02    3. Hypertension, unspecified type  I10        Discharge Medications:  New Prescriptions    No medications on file       Scribe Disclosure:  Liborio KASPER, am serving as a scribe at 3:23 AM on 6/26/2021 to document services personally performed by aZk Cabrera MD based on my observations and the provider's statements to me.          Zak Cabrera MD  06/26/21 2120

## 2021-06-26 NOTE — H&P
H and P dictated    71 yo [place] speaking f with hypertension, admit here 6/20-6/23 with htn, and recurrent syncope that was felt to be cough induced who at that hospitalization was diagnosed with COVID and found to have bilateral ground glass opacities treated with remdesivir and dexamethasone (discharged to complete 10 day course).     Represents with issues with hypertension at home 150-180's, intermittent CP/sob.    Er eval notable for hypertension,  hypoxia, undetectable trop x 2, normal EKG    She is being admitted for suspected symptomatic hypertension (CP)  -resumed valsartan 160 mg  -started meto 25 mg bid  -prn hydralazine      Hypoxia likely MF in etiology (COVID-still with infiltrates on CXR)  Check CT PE in light of recent covid diagnosis without hypoxia and now with hypoxia    Episodic chest pressure/pain I think related to uncontrolled hypertension   -treat hypertension  -complete troponin trend  -consider outpatient stress testing when BP controlled

## 2021-06-26 NOTE — ED TRIAGE NOTES
Pt recently tested positive for COVID. Here for worsening cough, decreased appetite, and htn. Cough noted on arrival. A+Ox4, no acute signs of distress

## 2021-06-27 LAB
ALBUMIN SERPL-MCNC: 2.2 G/DL (ref 3.4–5)
ALP SERPL-CCNC: 49 U/L (ref 40–150)
ALT SERPL W P-5'-P-CCNC: 30 U/L (ref 0–50)
ANION GAP SERPL CALCULATED.3IONS-SCNC: 3 MMOL/L (ref 3–14)
AST SERPL W P-5'-P-CCNC: 18 U/L (ref 0–45)
BILIRUB SERPL-MCNC: 0.6 MG/DL (ref 0.2–1.3)
BUN SERPL-MCNC: 12 MG/DL (ref 7–30)
CALCIUM SERPL-MCNC: 8.5 MG/DL (ref 8.5–10.1)
CHLORIDE SERPL-SCNC: 108 MMOL/L (ref 94–109)
CO2 SERPL-SCNC: 27 MMOL/L (ref 20–32)
CREAT SERPL-MCNC: 0.68 MG/DL (ref 0.52–1.04)
ERYTHROCYTE [DISTWIDTH] IN BLOOD BY AUTOMATED COUNT: 12.8 % (ref 10–15)
GFR SERPL CREATININE-BSD FRML MDRD: 88 ML/MIN/{1.73_M2}
GLUCOSE SERPL-MCNC: 130 MG/DL (ref 70–99)
HCT VFR BLD AUTO: 31.7 % (ref 35–47)
HGB BLD-MCNC: 10.4 G/DL (ref 11.7–15.7)
MCH RBC QN AUTO: 31 PG (ref 26.5–33)
MCHC RBC AUTO-ENTMCNC: 32.8 G/DL (ref 31.5–36.5)
MCV RBC AUTO: 94 FL (ref 78–100)
PLATELET # BLD AUTO: 331 10E9/L (ref 150–450)
POTASSIUM SERPL-SCNC: 4.3 MMOL/L (ref 3.4–5.3)
PROT SERPL-MCNC: 6.1 G/DL (ref 6.8–8.8)
RBC # BLD AUTO: 3.36 10E12/L (ref 3.8–5.2)
SODIUM SERPL-SCNC: 138 MMOL/L (ref 133–144)
WBC # BLD AUTO: 6.4 10E9/L (ref 4–11)

## 2021-06-27 PROCEDURE — 80053 COMPREHEN METABOLIC PANEL: CPT | Performed by: INTERNAL MEDICINE

## 2021-06-27 PROCEDURE — 250N000013 HC RX MED GY IP 250 OP 250 PS 637: Performed by: INTERNAL MEDICINE

## 2021-06-27 PROCEDURE — 36415 COLL VENOUS BLD VENIPUNCTURE: CPT | Performed by: INTERNAL MEDICINE

## 2021-06-27 PROCEDURE — 120N000001 HC R&B MED SURG/OB

## 2021-06-27 PROCEDURE — 85027 COMPLETE CBC AUTOMATED: CPT | Performed by: INTERNAL MEDICINE

## 2021-06-27 PROCEDURE — 250N000012 HC RX MED GY IP 250 OP 636 PS 637: Performed by: INTERNAL MEDICINE

## 2021-06-27 PROCEDURE — 99232 SBSQ HOSP IP/OBS MODERATE 35: CPT | Performed by: INTERNAL MEDICINE

## 2021-06-27 PROCEDURE — 250N000011 HC RX IP 250 OP 636: Performed by: INTERNAL MEDICINE

## 2021-06-27 RX ORDER — ASPIRIN 81 MG/1
81 TABLET ORAL DAILY
Status: DISCONTINUED | OUTPATIENT
Start: 2021-06-27 | End: 2021-06-29 | Stop reason: HOSPADM

## 2021-06-27 RX ORDER — NALOXONE HYDROCHLORIDE 0.4 MG/ML
0.4 INJECTION, SOLUTION INTRAMUSCULAR; INTRAVENOUS; SUBCUTANEOUS
Status: DISCONTINUED | OUTPATIENT
Start: 2021-06-27 | End: 2021-06-29 | Stop reason: HOSPADM

## 2021-06-27 RX ORDER — NALOXONE HYDROCHLORIDE 0.4 MG/ML
0.2 INJECTION, SOLUTION INTRAMUSCULAR; INTRAVENOUS; SUBCUTANEOUS
Status: DISCONTINUED | OUTPATIENT
Start: 2021-06-27 | End: 2021-06-29 | Stop reason: HOSPADM

## 2021-06-27 RX ORDER — HYDROMORPHONE HYDROCHLORIDE 1 MG/ML
0.3 INJECTION, SOLUTION INTRAMUSCULAR; INTRAVENOUS; SUBCUTANEOUS
Status: DISCONTINUED | OUTPATIENT
Start: 2021-06-27 | End: 2021-06-29 | Stop reason: HOSPADM

## 2021-06-27 RX ORDER — LEVOTHYROXINE SODIUM 100 UG/1
100 TABLET ORAL DAILY
Status: DISCONTINUED | OUTPATIENT
Start: 2021-06-27 | End: 2021-06-29 | Stop reason: HOSPADM

## 2021-06-27 RX ADMIN — Medication 12.5 MG: at 08:58

## 2021-06-27 RX ADMIN — ENOXAPARIN SODIUM 40 MG: 40 INJECTION SUBCUTANEOUS at 08:58

## 2021-06-27 RX ADMIN — Medication 12.5 MG: at 20:13

## 2021-06-27 RX ADMIN — GUAIFENESIN AND DEXTROMETHORPHAN 5 ML: 100; 10 SYRUP ORAL at 16:19

## 2021-06-27 RX ADMIN — VALSARTAN 160 MG: 160 TABLET, FILM COATED ORAL at 08:58

## 2021-06-27 RX ADMIN — ASPIRIN 81 MG: 81 TABLET ORAL at 13:31

## 2021-06-27 RX ADMIN — GUAIFENESIN AND DEXTROMETHORPHAN 5 ML: 100; 10 SYRUP ORAL at 09:00

## 2021-06-27 RX ADMIN — HYDROMORPHONE HYDROCHLORIDE 0.3 MG: 1 INJECTION, SOLUTION INTRAMUSCULAR; INTRAVENOUS; SUBCUTANEOUS at 22:49

## 2021-06-27 RX ADMIN — LEVOTHYROXINE SODIUM 100 MCG: 0.1 TABLET ORAL at 13:31

## 2021-06-27 RX ADMIN — DEXAMETHASONE 6 MG: 2 TABLET ORAL at 08:58

## 2021-06-27 ASSESSMENT — ACTIVITIES OF DAILY LIVING (ADL)
ADLS_ACUITY_SCORE: 16
ADLS_ACUITY_SCORE: 18
ADLS_ACUITY_SCORE: 16

## 2021-06-27 NOTE — PLAN OF CARE
Patient admitted to  308 3rd floor, med / surg    Patient alert & oriented x 4 denies pain sob, admitted using  ipad [place]  / [place]   Oriented to unit and room  Vss, alert & oriented x 4, denies pain an sob, up to bathroom in room independently and with causing any discomfort  Patient states she is very worried about her high blood pressure. Reassured patient that her BP while on 3rd floor today is  Good & within normal limits  Lungs are coarse,has developed a harsh non productive cough this later afternoon, update to MD for cough med.  Good appetite,   Daughter Pam states patient ha sharsh cough, feeling very poorly, not eating, nauseated and occasional voimting   may not be taking meds   valsarten not given as med not available and later in shift bp closer to normal limits.  MD informed  Plan: cont poc, treat as needed, patient in airborne isolation due to covid + test

## 2021-06-27 NOTE — PLAN OF CARE
Assumed care from 1900 to 0730  COVID+ 6/20, precautions in place  Canadian speaking  A&Ox4, up SBA  LDA: PIV SL  Vitals: stable, 2L/NC  LS coarse throughout  Pain: denies  Freq cough, PRN robitussin  Tele: SR, tall Ts  Plan: PO decadron, wean O2 as able  Will continue to monitor

## 2021-06-27 NOTE — PROGRESS NOTES
[facility]  Hospitalist Progress Note  Name: Rianna Duffy    MRN: [id number]  Physician:  Joseph José DO JENNIFER (Text Page)    Summary of Stay:  Ms. Duffy is a 70-year-old [place]ym[place]-speaking female who has a past medical history significant for hypertension, and hypothyroidism.  She was actually hospitalized at this facility from 06/20/2021 through 06/23/2021 for syncopal spells.  She was found to be COVID-19 Positive felt to be contributing.  She presented for syncope and family was under the impression that it was related to uncontrolled high blood pressure at home.  During that admission, she had an echocardiogram, which was within normal limits with an ejection fraction of 60%-65% and normal global systolic function.  Ultimately, it was felt that the syncopal spells were probably precipitated by coughing and at discharge, she was just maintained on her typical antihypertensive of valsartan at 160 mg a day.  She was treated with dexamethasone, remdesivir, and oxygen for the COVID.  By discharge she was off oxygen.  She completed most of the Remdesivir course but not the full course as she had improved and discharged.  She returns feeling unwell with spikes of BP + hypoxia.    Assessment & Plan    COVID-19 Pneumonitis  Acute Hypoxic Resp Failure:  -  Weaning O2, still needing 1 liter today to keep sat around 92%.    -  Continue dexamethasone  -  Holding on more Remdesivir as already completed majority of course last admission.  -  Robitussin-DM for cough, patient thinks cough improved today  -  Patient also reportedly had nausea/reduced appetite leading into this admission.  Appetite improved today and ate more.  -  COVID Anticoag:  Lovenox 40 mg subcut daily    HTN:  -  BP improved today.  Continue metoprolol low dose added prior.  Also continue home valsartan dose.     Hypothyroidism:  -  Levothyroxine    Deconditioning:  -  Physical Therapy    Anemia:  -  Suspect a little  dilutional.  Recheck in AM.  No overt bleeding.         COVID Status:  COVID-19 PCR Results    COVID-19 PCR Results 6/20/21   SARS-CoV-2 Virus Specimen Source Nasopharyngeal   SARS-CoV-2 PCR Result POSITIVE (A)   (A) Abnormal value       Comments are available for some flowsheets but are not being displayed.         COVID-19 Antibody Results, Testing for Immunity    COVID-19 Antibody Results, Testing for Immunity   No data to display.            Diet: Combination Diet Regular Diet Adult    DVT Prophylaxis: Enoxaparin (Lovenox) SQ  Ventura Catheter: Not present  Code Status: Full Code      Disposition Plan   Expected discharge in 1-2 more days, suspect more likely 2.  Needs to be weaned off O2 before considering discharge.     Entered: Joseph José 06/27/2021, 4:58 PM       Interval History   Ms. Duffy without new complaints.  Communication occurred with professional .  She denied any major pain.  She still has a harsh cough but feels it is improving.  She feels SOB with activity though improved.  No nausea but appetite still somewhat reduced.    -Data reviewed today: I reviewed all new labs and imaging reports over the last 24 hours. I personally reviewed no images or EKG's today.    Physical Exam   Temp: 98.1  F (36.7  C) Temp src: Oral BP: 133/72 Pulse: 67   Resp: 24(with activity) SpO2: 99 % O2 Device: Nasal cannula Oxygen Delivery: 1 LPM  Vitals:    06/26/21 0854   Weight: 92.1 kg (203 lb)     Vital Signs with Ranges  Temp:  [97.8  F (36.6  C)-98.8  F (37.1  C)] 98.1  F (36.7  C)  Pulse:  [67-93] 67  Resp:  [16-24] 24  BP: (119-140)/(57-83) 133/72  SpO2:  [91 %-99 %] 99 %  I/O last 3 completed shifts:  In: 843 [P.O.:820; I.V.:23]  Out: -     GEN:  Alert, oriented, appears ill but comfortable, no overt distress.  HEENT:  Normocephalic/atraumatic, no scleral icterus, no nasal discharge, mouth moist.  CV:  Regular rate and rhythm, no loud murmur/rub.  LUNGS:  Coarse bilaterally, no  wheezes/retractions.  Symmetric chest rise on inhalation noted.  ABD:  Active bowel sounds, soft, non-tender/non-distended.  No rebound/guarding/rigidity.  EXT:  Trace edema.  No cyanosis.  No acute joint synovitis noted.  SKIN:  Dry to touch, no exanthems noted in the visualized areas.    Medications       aspirin  81 mg Oral Daily     dexamethasone  6 mg Oral Daily     enoxaparin ANTICOAGULANT  40 mg Subcutaneous Q24H     levothyroxine  100 mcg Oral Daily     metoprolol tartrate  12.5 mg Oral BID     sodium chloride (PF)  3 mL Intracatheter Q8H     valsartan  160 mg Oral Daily     Data     Recent Labs   Lab 06/27/21  0622 06/26/21  0113 06/23/21  0738   WBC 6.4 7.8 4.7   HGB 10.4* 12.2 12.0   HCT 31.7* 36.1 35.7   MCV 94 93 94    326 262     Recent Labs   Lab 06/26/21  0235 06/26/21  0202 06/21/21  0745   CULT No growth after 1 day No growth after 1 day 50,000 to 100,000 colonies/mL  mixed urogenital viktoriya  Susceptibility testing not routinely done       Recent Labs   Lab 06/27/21  0622 06/26/21  0943 06/26/21  0113 06/23/21  0738     --  135 139   POTASSIUM 4.3  --  3.4 4.2   CHLORIDE 108  --  103 108   CO2 27  --  24 25   ANIONGAP 3  --  8 6   *  --  120* 135*   BUN 12  --  8 25   CR 0.68 0.61 0.59 0.64   GFRESTIMATED 88 >90 >90 90   GFRESTBLACK >90 >90 >90 >90   MEHRAN 8.5  --  8.7 8.4*   PROTTOTAL 6.1*  --   --   --    ALBUMIN 2.2*  --   --   --    BILITOTAL 0.6  --   --   --    ALKPHOS 49  --   --   --    AST 18  --   --   --    ALT 30  --   --   --        No results found for this or any previous visit (from the past 24 hour(s)).

## 2021-06-27 NOTE — PROGRESS NOTES
End of Shift Summary.  For vital signs and complete assessments, please see documentation flowsheets.     Pertinent assessments: A/O Up with SBA. 1L O2. MERCADO. Lungs clear and dim. Voiding, Small BM. VSS. Afebrile.  Eating fine. Slight non productive cough  Major Shift Events: wean O2, now on 1L. Robitussin for cough as needed.  Plan (Upcoming Events): oral steroids, wean O2 as tolerated. Restarted home asa.  Discharge/Transfer Needs: home when able    Bedside Shift Report Completed   Bedside Safety Check Completed

## 2021-06-28 ENCOUNTER — APPOINTMENT (OUTPATIENT)
Dept: PHYSICAL THERAPY | Facility: CLINIC | Age: 71
DRG: 177 | End: 2021-06-28
Attending: INTERNAL MEDICINE
Payer: COMMERCIAL

## 2021-06-28 LAB
ERYTHROCYTE [DISTWIDTH] IN BLOOD BY AUTOMATED COUNT: 12.7 % (ref 10–15)
HCT VFR BLD AUTO: 33.1 % (ref 35–47)
HGB BLD-MCNC: 11 G/DL (ref 11.7–15.7)
INTERPRETATION ECG - MUSE: NORMAL
INTERPRETATION ECG - MUSE: NORMAL
MCH RBC QN AUTO: 31.6 PG (ref 26.5–33)
MCHC RBC AUTO-ENTMCNC: 33.2 G/DL (ref 31.5–36.5)
MCV RBC AUTO: 95 FL (ref 78–100)
PLATELET # BLD AUTO: 348 10E9/L (ref 150–450)
RBC # BLD AUTO: 3.48 10E12/L (ref 3.8–5.2)
WBC # BLD AUTO: 6.9 10E9/L (ref 4–11)

## 2021-06-28 PROCEDURE — 250N000012 HC RX MED GY IP 250 OP 636 PS 637: Performed by: INTERNAL MEDICINE

## 2021-06-28 PROCEDURE — 99232 SBSQ HOSP IP/OBS MODERATE 35: CPT | Performed by: INTERNAL MEDICINE

## 2021-06-28 PROCEDURE — 250N000013 HC RX MED GY IP 250 OP 250 PS 637: Performed by: INTERNAL MEDICINE

## 2021-06-28 PROCEDURE — 120N000001 HC R&B MED SURG/OB

## 2021-06-28 PROCEDURE — 97161 PT EVAL LOW COMPLEX 20 MIN: CPT | Mod: GP

## 2021-06-28 PROCEDURE — 85027 COMPLETE CBC AUTOMATED: CPT | Performed by: INTERNAL MEDICINE

## 2021-06-28 PROCEDURE — 36415 COLL VENOUS BLD VENIPUNCTURE: CPT | Performed by: INTERNAL MEDICINE

## 2021-06-28 PROCEDURE — 250N000011 HC RX IP 250 OP 636: Performed by: INTERNAL MEDICINE

## 2021-06-28 PROCEDURE — 99207 PR CDG-MDM COMPONENT: MEETS LOW - DOWN CODED: CPT | Performed by: INTERNAL MEDICINE

## 2021-06-28 RX ADMIN — ASPIRIN 81 MG: 81 TABLET ORAL at 09:03

## 2021-06-28 RX ADMIN — Medication 12.5 MG: at 09:03

## 2021-06-28 RX ADMIN — ENOXAPARIN SODIUM 40 MG: 40 INJECTION SUBCUTANEOUS at 09:02

## 2021-06-28 RX ADMIN — GUAIFENESIN AND DEXTROMETHORPHAN 5 ML: 100; 10 SYRUP ORAL at 09:03

## 2021-06-28 RX ADMIN — GUAIFENESIN AND DEXTROMETHORPHAN 5 ML: 100; 10 SYRUP ORAL at 21:12

## 2021-06-28 RX ADMIN — DEXAMETHASONE 6 MG: 2 TABLET ORAL at 09:03

## 2021-06-28 RX ADMIN — LEVOTHYROXINE SODIUM 100 MCG: 0.1 TABLET ORAL at 09:03

## 2021-06-28 RX ADMIN — Medication 12.5 MG: at 21:12

## 2021-06-28 RX ADMIN — BISACODYL 10 MG: 5 TABLET, COATED ORAL at 12:35

## 2021-06-28 RX ADMIN — VALSARTAN 160 MG: 160 TABLET, FILM COATED ORAL at 09:03

## 2021-06-28 ASSESSMENT — ACTIVITIES OF DAILY LIVING (ADL)
ADLS_ACUITY_SCORE: 16

## 2021-06-28 NOTE — PLAN OF CARE
"/59 (BP Location: Left arm)   Pulse 70   Temp 98  F (36.7  C) (Oral)   Resp 19   Ht 1.575 m (5' 2\")   Wt 92.1 kg (203 lb)   SpO2 96%   BMI 37.13 kg/m      A&O. Up ad ira. Tele is SR. On 0.5 L O2 via NC, desats with activity and recovers well. Infrequent cough, PRN Robitussin given. Plan is to continue to wean off O2 and poss discharge home tomorrow. This RN spoke with daughter who was updated on plan of care.   "

## 2021-06-28 NOTE — PROVIDER NOTIFICATION
Pt is reporting some mild chest pressure and pain. Troponins trended yesterday AM, all negative and EKG done showing SR. Do you advise any further interventions? Thanks    Addendum: MD ordered 0.3mg IV dilaudid prn.

## 2021-06-28 NOTE — PLAN OF CARE
Took over care of pt from [id number]. Pt is a/o, [place]araguan speaking. VSS- On 1/2L nasal cannula. Denies pain. Up SBA. Plan to discharge home tomorrow if oxygen is stable.

## 2021-06-28 NOTE — PROGRESS NOTES
06/28/21 0900   Quick Adds   Type of Visit Initial PT Evaluation   Living Environment   People in home spouse   Current Living Arrangements house   Living Environment Comments Pt reports living in a house with her spouse. Bedroom on the main level. Spouse in good health to assist. IND with mobility; denies falls.    Disability/Function   Walking or Climbing Stairs Difficulty no   Fall history within last six months no   General Information   Onset of Illness/Injury or Date of Surgery 06/26/21   Referring Physician [place] DO   Patient/Family Therapy Goals Statement (PT) Not stated.    Pertinent History of Current Problem (include personal factors and/or comorbidities that impact the POC) 70-year-old Luxembourger-speaking female who has a past medical history significant for hypertension, and hypothyroidism.  She was actually hospitalized at this facility from 06/20/2021 through 06/23/2021 for syncopal spells.  She was found to be COVID-19 Positive felt to be contributing. Currently being treated for acute resp failure.    Existing Precautions/Restrictions fall;oxygen therapy device and L/min  (.5L via NC)   Pain Assessment   Patient Currently in Pain Yes, see Vital Sign flowsheet  (Mild sternal pain with exertion. )   Range of Motion (ROM)   ROM Comment No deficits identified.    Strength   Strength Comments Strength sufficient for mobility with supervision/IND   Bed Mobility   Comment (Bed Mobility) Semi-supine<>sit with IND   Transfers   Transfer Safety Comments Sit<>stand with IND   Gait/Stairs (Locomotion)   Comment (Gait/Stairs) Pt ambulates ~100' with no AD; supervision/IND. No LOB. SpO2 87-90% on .5L; improves to >90% with cues for PLB.    Balance   Balance Comments Good dynamic balance.    Clinical Impression   Criteria for Skilled Therapeutic Intervention evaluation only   Clinical Presentation Stable/Uncomplicated   Clinical Presentation Rationale Medically stable; mobility does not appear to be a barrier to  safe discharge.    Clinical Decision Making (Complexity) low complexity   Risk & Benefits of therapy have been explained evaluation/treatment results reviewed;care plan/treatment goals reviewed;participants included;patient   PT Discharge Planning    PT Discharge Recommendation (DC Rec) home   PT Rationale for DC Rec Moving well despite MERCADO/SOB; at this time mobility does not appear to be a barrier to home when medically ready; pt reports she is agreeable to continue to ambulate with RN staff in her room for duration of stay.    PT Brief overview of current status  SBA.    Total Evaluation Time   Total Evaluation Time (Minutes) 11

## 2021-06-28 NOTE — PLAN OF CARE
Bangladeshi speaking,  used for assessment. VSS, SpO2 95-98% on 1L NC. Tele SR. Pt reporting 4/10 chest tightness/pressure, worsened with coughing. MD notified and prn IV dilaudid given, which was effective per pt. Denies SOB at rest but does have some slight MERCADO. LS clear/diminished. Up with SBA. Continue to wean O2.

## 2021-06-28 NOTE — PROGRESS NOTES
[facility]  Hospitalist Progress Note  Name: Rianna Duffy    MRN: [id number]  Physician:  Power Vega MD.    Summary of Stay:  Ms. Duffy is a 70-year-old Bhutanese-speaking female who has a past medical history significant for hypertension, and hypothyroidism.  She was actually hospitalized at this facility from 06/20/2021 through 06/23/2021 for syncopal spells.  She was found to be COVID-19 Positive felt to be contributing.  She presented for syncope and family was under the impression that it was related to uncontrolled high blood pressure at home.  During that admission, she had an echocardiogram, which was within normal limits with an ejection fraction of 60%-65% and normal global systolic function.  Ultimately, it was felt that the syncopal spells were probably precipitated by coughing and at discharge, she was just maintained on her typical antihypertensive of valsartan at 160 mg a day.  She was treated with dexamethasone, remdesivir, and oxygen for the COVID.  By discharge she was off oxygen.  She completed most of the Remdesivir course but not the full course as she had improved and discharged.  She returns feeling unwell with spikes of BP + hypoxia.    Assessment & Plan    COVID-19 Pneumonitis  Acute Hypoxic Resp Failure:  -  We will wean her of oxygen today completely, currently saturating 94 to 96% on 1 l.   -Reported she desats to mid 80s during activity.  -  Continue dexamethasone  -  Holding on more Remdesivir as already completed majority of course last admission.  -  Robitussin-DM for cough, patient thinks cough improved today  -  Patient also reportedly had nausea/reduced appetite leading into this admission.  Appetite improved today and ate more.  -  COVID Anticoag:  Lovenox 40 mg subcut daily.  -  Incentive spirometry, encourage deep breathing.      HTN:  -Continue to monitor blood pressure .  -Continue metoprolol and valsartan as ordered.     Hypothyroidism:  -   Levothyroxine    Deconditioning:  -  Physical Therapy    Anemia:  -Hemoglobin is stable.  No need to trended .  -No sign of bleeding from any site.      I discussed with patient at length the plan of care, all her question concerns addressed.     COVID Status:  COVID-19 PCR Results    COVID-19 PCR Results 6/20/21   SARS-CoV-2 Virus Specimen Source Nasopharyngeal   SARS-CoV-2 PCR Result POSITIVE (A)   (A) Abnormal value       Comments are available for some flowsheets but are not being displayed.         COVID-19 Antibody Results, Testing for Immunity    COVID-19 Antibody Results, Testing for Immunity   No data to display.            Diet: Combination Diet Regular Diet Adult    DVT Prophylaxis: Enoxaparin (Lovenox) SQ  [place] Catheter: Not present  Code Status: Full Code      Disposition Plan   Expected discharge in 1 day if she continues to improve, and off oxygen.       Entered: Power Marisol Gary 06/28/2021, 11:48 AM       Interval History   Patient seen and examined, assumed care today, no new overnight issues, feels better.  Cough is improving, shortness of breath is better, requires oxygen with activity as she desats to mid 80s, at rest she is on 1 L of oxygen saturating mid 90s.    -Data reviewed today: I reviewed all new labs and imaging reports over the last 24 hours. I personally reviewed no images or EKG's today.    Physical Exam   Temp: 98  F (36.7  C) Temp src: Oral BP: 121/59 Pulse: 70   Resp: 19 SpO2: 96 % O2 Device: Nasal cannula Oxygen Delivery: 1/2 LPM  Vitals:    06/26/21 0854   Weight: 92.1 kg (203 lb)     Vital Signs with Ranges  Temp:  [97.9  F (36.6  C)-98.9  F (37.2  C)] 98  F (36.7  C)  Pulse:  [67-81] 70  Resp:  [16-24] 19  BP: (119-152)/(59-90) 121/59  SpO2:  [91 %-99 %] 96 %  I/O last 3 completed shifts:  In: 720 [P.O.:720]  Out: -     GEN:  Alert, oriented, appears ill but comfortable, no overt distress.  HEENT:  Normocephalic/atraumatic, no scleral icterus, no nasal discharge, mouth  moist.  CV:  Regular rate and rhythm, no loud murmur/rub.  LUNGS:  Coarse bilaterally, no wheezes/retractions.  Symmetric chest rise on inhalation noted.  ABD:  Active bowel sounds, soft, non-tender/non-distended.  No rebound/guarding/rigidity.  EXT:  Trace edema.  No cyanosis.  No acute joint synovitis noted.  SKIN:  Dry to touch, no exanthems noted in the visualized areas.    Medications       aspirin  81 mg Oral Daily     dexamethasone  6 mg Oral Daily     enoxaparin ANTICOAGULANT  40 mg Subcutaneous Q24H     levothyroxine  100 mcg Oral Daily     metoprolol tartrate  12.5 mg Oral BID     sodium chloride (PF)  3 mL Intracatheter Q8H     valsartan  160 mg Oral Daily     Data     Recent Labs   Lab 06/28/21  0702 06/27/21  0622 06/26/21  0113   WBC 6.9 6.4 7.8   HGB 11.0* 10.4* 12.2   HCT 33.1* 31.7* 36.1   MCV 95 94 93    331 326     Recent Labs   Lab 06/26/21  0235 06/26/21  0202   CULT No growth after 2 days No growth after 2 days     Recent Labs   Lab 06/27/21  0622 06/26/21  0943 06/26/21  0113 06/23/21  0738     --  135 139   POTASSIUM 4.3  --  3.4 4.2   CHLORIDE 108  --  103 108   CO2 27  --  24 25   ANIONGAP 3  --  8 6   *  --  120* 135*   BUN 12  --  8 25   CR 0.68 0.61 0.59 0.64   GFRESTIMATED 88 >90 >90 90   GFRESTBLACK >90 >90 >90 >90   MEHRAN 8.5  --  8.7 8.4*   PROTTOTAL 6.1*  --   --   --    ALBUMIN 2.2*  --   --   --    BILITOTAL 0.6  --   --   --    ALKPHOS 49  --   --   --    AST 18  --   --   --    ALT 30  --   --   --        No results found for this or any previous visit (from the past 24 hour(s)).

## 2021-06-29 VITALS
TEMPERATURE: 98.3 F | BODY MASS INDEX: 37.36 KG/M2 | HEIGHT: 62 IN | SYSTOLIC BLOOD PRESSURE: 162 MMHG | WEIGHT: 203 LBS | RESPIRATION RATE: 16 BRPM | OXYGEN SATURATION: 92 % | DIASTOLIC BLOOD PRESSURE: 72 MMHG | HEART RATE: 75 BPM

## 2021-06-29 LAB
CREAT SERPL-MCNC: 0.71 MG/DL (ref 0.52–1.04)
GFR SERPL CREATININE-BSD FRML MDRD: 86 ML/MIN/{1.73_M2}
PLATELET # BLD AUTO: 369 10E9/L (ref 150–450)

## 2021-06-29 PROCEDURE — 250N000011 HC RX IP 250 OP 636: Performed by: INTERNAL MEDICINE

## 2021-06-29 PROCEDURE — 82565 ASSAY OF CREATININE: CPT | Performed by: INTERNAL MEDICINE

## 2021-06-29 PROCEDURE — 250N000012 HC RX MED GY IP 250 OP 636 PS 637: Performed by: INTERNAL MEDICINE

## 2021-06-29 PROCEDURE — 250N000013 HC RX MED GY IP 250 OP 250 PS 637: Performed by: INTERNAL MEDICINE

## 2021-06-29 PROCEDURE — 99239 HOSP IP/OBS DSCHRG MGMT >30: CPT | Performed by: INTERNAL MEDICINE

## 2021-06-29 PROCEDURE — 36415 COLL VENOUS BLD VENIPUNCTURE: CPT | Performed by: INTERNAL MEDICINE

## 2021-06-29 PROCEDURE — 85049 AUTOMATED PLATELET COUNT: CPT | Performed by: INTERNAL MEDICINE

## 2021-06-29 RX ORDER — METOPROLOL TARTRATE 25 MG/1
25 TABLET, FILM COATED ORAL 2 TIMES DAILY
Qty: 60 TABLET | Refills: 0 | Status: SHIPPED | OUTPATIENT
Start: 2021-06-29

## 2021-06-29 RX ADMIN — ENOXAPARIN SODIUM 40 MG: 40 INJECTION SUBCUTANEOUS at 08:21

## 2021-06-29 RX ADMIN — VALSARTAN 160 MG: 160 TABLET, FILM COATED ORAL at 08:15

## 2021-06-29 RX ADMIN — DEXAMETHASONE 6 MG: 2 TABLET ORAL at 08:15

## 2021-06-29 RX ADMIN — ASPIRIN 81 MG: 81 TABLET ORAL at 08:15

## 2021-06-29 RX ADMIN — Medication 12.5 MG: at 08:15

## 2021-06-29 RX ADMIN — LEVOTHYROXINE SODIUM 100 MCG: 0.1 TABLET ORAL at 08:16

## 2021-06-29 ASSESSMENT — ACTIVITIES OF DAILY LIVING (ADL)
ADLS_ACUITY_SCORE: 16

## 2021-06-29 NOTE — PLAN OF CARE
"A&O, SBA, Tele SB w/ tall T waves (HR 58 per tele monitor). 1/2L NC, LS Clear. PIV (R) SL. Reg Diet. Denies pain & SOB. Plan to discharge today when O2 stable. Continue to monitor. VS: BP (!) 144/64 (BP Location: Left arm)   Pulse 72   Temp 98.9  F (37.2  C) (Oral)   Resp 16   Ht 1.575 m (5' 2\")   Wt 92.1 kg (203 lb)   SpO2 95%   BMI 37.13 kg/m     "

## 2021-06-29 NOTE — DISCHARGE SUMMARY
[facility]  Discharge Summary  Name: Rianna Duffy    MRN: [id number]  YOB: 1950    Age: 70 year old  Date of Discharge:  6/29/2021  1:41 PM  Date of Admission: 6/26/2021  Primary Care Provider: Emily Forman  Discharge Physician:  Power Vega M.D  Discharging Service:  Hospitalist      Discharge Diagnosis:  Acute hypoxic respiratory failure due to COVID-19 pneumonia     Other Diagnosis:  Hypertension  Hypothyroidism  Deconditioning  Anemia     Discharge Disposition:  Discharged to home     Allergies:  No Known Allergies     Discharge Medications:   Discharge Medication List as of 6/29/2021 12:58 PM      START taking these medications    Details   metoprolol tartrate (LOPRESSOR) 25 MG tablet Take 1 tablet (25 mg) by mouth 2 times daily, Disp-60 tablet, R-0, E-Prescribe         CONTINUE these medications which have NOT CHANGED    Details   acetaminophen (TYLENOL) 325 MG tablet Take 650 mg by mouth every 6 hours as needed , Historical      aspirin 81 MG EC tablet Take 81 mg by mouth daily, Historical      benzonatate (TESSALON) 100 MG capsule Take 1 capsule (100 mg) by mouth 3 times daily as needed for cough, Disp-20 capsule, R-0, E-Prescribe      Glucosamine Sulfate 500 MG TABS Take 1 tablet by mouth daily as needed, Historical      guaiFENesin-codeine (ROBITUSSIN AC) 100-10 MG/5ML solution Take 5-10 mLs by mouth every 6 hours as needed for cough, Disp-240 mL, R-0, E-Prescribe      levothyroxine (SYNTHROID/LEVOTHROID) 100 MCG tablet Take 100 mcg by mouth daily, Historical      valsartan (DIOVAN) 160 MG tablet Take 160 mg by mouth daily, Historical         STOP taking these medications       dexamethasone (DECADRON) 6 MG tablet Comments:   Reason for Stopping:                Condition on Discharge:  Discharge condition: Fair   Discharge vitals: Blood pressure (!) 162/72, pulse 75, temperature 98.3  F (36.8  C), temperature source Oral, resp. rate 16, height 1.575 m (5'  "2\"), weight 92.1 kg (203 lb), SpO2 92 %.   Code status on discharge: Full Code     History of Illness:  See detailed admission note for full details.    Significant Physical Exam Findings:  GEN:  Alert, oriented, appears ill but comfortable, no overt distress.  HEENT:  Normocephalic/atraumatic, no scleral icterus, no nasal discharge, mouth moist.  CV:  Regular rate and rhythm, no loud murmur/rub.  LUNGS:  Coarse bilaterally, no wheezes/retractions.  Symmetric chest rise on inhalation noted.  ABD:  Active bowel sounds, soft, non-tender/non-distended.  No rebound/guarding/rigidity.  EXT:  Trace edema.  No cyanosis.  No acute joint synovitis noted.  SKIN:  Dry to touch, no exanthems noted in the visualized areas.    Procedures other than Imaging:  none     Imaging:  Results for orders placed or performed during the hospital encounter of 06/26/21   XR Chest Port 1 View    Narrative    EXAM: XR CHEST PORT 1 VIEW  LOCATION: [facility]  DATE/TIME: 6/26/2021 2:00 AM    INDICATION: Shortness of breath.  COMPARISON: 5/20/2021.    FINDINGS: The heart size is normal. There are bilateral multifocal pulmonary infiltrates. No pneumothorax. Degenerative disease in the spine.      Impression    IMPRESSION: Bilateral multifocal pneumonia.   CT Chest Pulmonary Embolism w Contrast    Narrative    CT CHEST PULMONARY EMBOLISM W CONTRAST 6/26/2021 10:14 AM    CLINICAL HISTORY: PE suspected, high prob. Shortness of breath and  chest pressure.  TECHNIQUE: CT angiogram chest during arterial phase injection IV  contrast. 2D and 3D MIP reconstructions were performed by the CT  technologist. Dose reduction techniques were used.     CONTRAST: 70mL Isovue-370    COMPARISON: 6/20/2021    FINDINGS:  ANGIOGRAM CHEST: Pulmonary arteries are normal caliber and negative  for pulmonary emboli. Thoracic aorta is negative for dissection. No CT  evidence of right heart strain.    LUNGS AND PLEURA: Extensive multifocal patchy and " groundglass  opacities consistent with pneumonia have not significantly changed. No  pneumothorax. No significant pleural effusions.    MEDIASTINUM/AXILLAE: Mild mediastinal and hilar lymphadenopathy is  stable, likely reactive. No thoracic aortic aneurysm. Mild coronary  artery calcifications. No pericardial effusion.    UPPER ABDOMEN: Normal.    MUSCULOSKELETAL: Degenerative changes in the spine. No suspicious  lesions within the bones. Elevated right hemidiaphragm.      Impression    IMPRESSION:  1.  No pulmonary emboli.  2.  No significant change in multifocal groundglass and patchy  airspace opacities consistent with pneumonia.  3.  Stable mild mediastinal and hilar lymphadenopathy, likely  reactive.    INES ORTIZ MD        Consultations:  No consultations were requested during this admission.     Recent Lab Results:  Recent Labs   Lab 06/29/21  0619 06/28/21  0702 06/27/21  0622 06/26/21  0113   WBC  --  6.9 6.4 7.8   HGB  --  11.0* 10.4* 12.2   HCT  --  33.1* 31.7* 36.1   MCV  --  95 94 93    348 331 326     Recent Labs   Lab 06/29/21  0619 06/27/21  0622 06/26/21  0943 06/26/21  0113 06/23/21  0738   NA  --  138  --  135 139   POTASSIUM  --  4.3  --  3.4 4.2   CHLORIDE  --  108  --  103 108   CO2  --  27  --  24 25   ANIONGAP  --  3  --  8 6   GLC  --  130*  --  120* 135*   BUN  --  12  --  8 25   CR 0.71 0.68 0.61 0.59 0.64   GFRESTIMATED 86 88 >90 >90 90   GFRESTBLACK >90 >90 >90 >90 >90   MEHRAN  --  8.5  --  8.7 8.4*     Recent Labs   Lab 06/27/21  0622 06/26/21  0113 06/23/21  0738   * 120* 135*          Pending Results:    Unresulted Labs Ordered in the Past 30 Days of this Admission     Date and Time Order Name Status Description    6/26/2021 0200 Blood culture Preliminary     6/26/2021 0200 Blood culture Preliminary               Reason for your hospital stay    COVID 19 pneumonitis, hypoxic respiratory failure.     Follow-up and recommended labs and tests     Follow up with primary  care provider, Emily Forman, within 7 days for hospital follow- up.     Activity    Your activity upon discharge: activity as tolerated     Incentive Spirometry    Continue to use incentive spirometer 8 times a day Until return to normal activity     Full Code     Diet    Follow this diet upon discharge: Orders Placed This Encounter      Combination Diet Regular Diet Adult       Hospital Course:   Ms. Duffy is a 70-year-old [place]ry[place]-speaking female who has a past medical history significant for hypertension, and hypothyroidism.  She was actually hospitalized at this facility from 06/20/2021 through 06/23/2021 for syncopal spells.  She was found to be COVID-19 Positive felt to be contributing.  She presented for syncope and family was under the impression that it was related to uncontrolled high blood pressure at home.  During that admission, she had an echocardiogram, which was within normal limits with an ejection fraction of 60%-65% and normal global systolic function.  Ultimately, it was felt that the syncopal spells were probably precipitated by coughing and at discharge, she was just maintained on her typical antihypertensive of valsartan at 160 mg a day.  She was treated with dexamethasone, remdesivir, and oxygen for the COVID.  By discharge she was off oxygen.  She completed most of the Remdesivir course but not the full course as she had improved and discharged.  She returns feeling unwell with spikes of BP + hypoxia.     COVID-19 Pneumonitis  Acute Hypoxic Resp Failure:   Patient required oxygen on presentation due to hypoxia, reported she desats to mid 80s during activity, after admission it was titrated down, and able to wean her of oxygen completely.  Her oxygenation was in the upper 90s, encourage her to take deep breath, she will be on cough medicine.  Patient does require bronchodilators.  She completed dexamethasone that was started on previous admission.  Advised to continue incentive  spirometry, exercise deep breathing,.   Overall she showed significant improvement and discharged in stable condition    HTN:   Blood pressure was fairly controlled, she will be on metoprolol and valsartan    Hypothyroidism: Levothyroxine     Deconditioning: He was in the hospital, had Covid infection, physical Therapy     Anemia: Hemoglobin is stable. No sign of bleeding from any site.     I discussed with patient at length the plan of discharge and follow-up.  all her question concerns addressed.  I called her daughter to discuss, unable to leave a voice message as it was full.       Total time spent in face to face contact with the patient and coordinating discharge was:  >30 Minutes.

## 2021-06-29 NOTE — PROGRESS NOTES
"BP (!) 162/72 (BP Location: Other (Comment))   Pulse 75   Temp 98.3  F (36.8  C) (Oral)   Resp 16   Ht 1.575 m (5' 2\")   Wt 92.1 kg (203 lb)   SpO2 92%   BMI 37.13 kg/m      A&O. SBA. Tele is SB. On RA, denies SOB. Does take shallow breathes, educated on taking deeper breathes. Will go home on Metoprolol.     AVS reviewed with patient. Patient is in stable condition, VSS, no co pain/cp/sob. All discharge education reviewed with pt in regards to: diet, activity, safety, s/s to report, medications and rx, follow up appointments/care.  All questions answered. Pt denies any further questions or concerns. PIV removed. No complications. Telemetry monitor removed. All belongings returned. Pt escorted to front door by [place] staff.     Education given in English. Pts daughter is picking her up.   "

## 2021-07-02 LAB
BACTERIA SPEC CULT: NO GROWTH
BACTERIA SPEC CULT: NO GROWTH
SPECIMEN SOURCE: NORMAL
SPECIMEN SOURCE: NORMAL

## 2024-04-02 ENCOUNTER — MEDICAL CORRESPONDENCE (OUTPATIENT)
Dept: HEALTH INFORMATION MANAGEMENT | Facility: CLINIC | Age: 74
End: 2024-04-02
Payer: COMMERCIAL

## 2024-04-02 ENCOUNTER — TRANSFERRED RECORDS (OUTPATIENT)
Dept: HEALTH INFORMATION MANAGEMENT | Facility: CLINIC | Age: 74
End: 2024-04-02
Payer: COMMERCIAL

## 2024-04-02 DIAGNOSIS — R07.9 CHEST PAIN: Primary | ICD-10-CM

## 2024-05-13 ENCOUNTER — HOSPITAL ENCOUNTER (OUTPATIENT)
Dept: CARDIOLOGY | Facility: CLINIC | Age: 74
Discharge: HOME | End: 2024-05-13
Attending: INTERNAL MEDICINE | Admitting: INTERNAL MEDICINE
Payer: COMMERCIAL

## 2024-05-13 VITALS — HEART RATE: 68 BPM | DIASTOLIC BLOOD PRESSURE: 78 MMHG | SYSTOLIC BLOOD PRESSURE: 153 MMHG

## 2024-05-13 DIAGNOSIS — R07.9 CHEST PAIN: ICD-10-CM

## 2024-05-13 LAB
CREAT BLD-MCNC: 0.7 MG/DL (ref 0.5–1)
EGFRCR SERPLBLD CKD-EPI 2021: >60 ML/MIN/1.73M2

## 2024-05-13 PROCEDURE — 250N000011 HC RX IP 250 OP 636: Performed by: INTERNAL MEDICINE

## 2024-05-13 PROCEDURE — 75574 CT ANGIO HRT W/3D IMAGE: CPT | Mod: 26 | Performed by: INTERNAL MEDICINE

## 2024-05-13 PROCEDURE — 75574 CT ANGIO HRT W/3D IMAGE: CPT

## 2024-05-13 PROCEDURE — 82565 ASSAY OF CREATININE: CPT

## 2024-05-13 PROCEDURE — 250N000013 HC RX MED GY IP 250 OP 250 PS 637: Performed by: INTERNAL MEDICINE

## 2024-05-13 RX ORDER — DIPHENHYDRAMINE HCL 25 MG
25 CAPSULE ORAL
Status: DISCONTINUED | OUTPATIENT
Start: 2024-05-13 | End: 2024-05-14 | Stop reason: HOSPADM

## 2024-05-13 RX ORDER — METHYLPREDNISOLONE SODIUM SUCCINATE 125 MG/2ML
125 INJECTION, POWDER, LYOPHILIZED, FOR SOLUTION INTRAMUSCULAR; INTRAVENOUS
Status: DISCONTINUED | OUTPATIENT
Start: 2024-05-13 | End: 2024-05-14 | Stop reason: HOSPADM

## 2024-05-13 RX ORDER — METOPROLOL TARTRATE 25 MG/1
25-100 TABLET, FILM COATED ORAL
Status: COMPLETED | OUTPATIENT
Start: 2024-05-13 | End: 2024-05-13

## 2024-05-13 RX ORDER — IOPAMIDOL 755 MG/ML
110 INJECTION, SOLUTION INTRAVASCULAR ONCE
Status: COMPLETED | OUTPATIENT
Start: 2024-05-13 | End: 2024-05-13

## 2024-05-13 RX ORDER — NITROGLYCERIN 0.4 MG/1
0.4 TABLET SUBLINGUAL
Status: COMPLETED | OUTPATIENT
Start: 2024-05-13 | End: 2024-05-13

## 2024-05-13 RX ORDER — ONDANSETRON 2 MG/ML
4 INJECTION INTRAMUSCULAR; INTRAVENOUS
Status: DISCONTINUED | OUTPATIENT
Start: 2024-05-13 | End: 2024-05-14 | Stop reason: HOSPADM

## 2024-05-13 RX ORDER — ACYCLOVIR 200 MG/1
0-1 CAPSULE ORAL
Status: DISCONTINUED | OUTPATIENT
Start: 2024-05-13 | End: 2024-05-14 | Stop reason: HOSPADM

## 2024-05-13 RX ORDER — DILTIAZEM HCL 60 MG
120 TABLET ORAL
Status: DISCONTINUED | OUTPATIENT
Start: 2024-05-13 | End: 2024-05-14 | Stop reason: HOSPADM

## 2024-05-13 RX ORDER — METOPROLOL TARTRATE 1 MG/ML
5-15 INJECTION, SOLUTION INTRAVENOUS
Status: DISCONTINUED | OUTPATIENT
Start: 2024-05-13 | End: 2024-05-14 | Stop reason: HOSPADM

## 2024-05-13 RX ORDER — DIPHENHYDRAMINE HYDROCHLORIDE 50 MG/ML
25-50 INJECTION INTRAMUSCULAR; INTRAVENOUS
Status: DISCONTINUED | OUTPATIENT
Start: 2024-05-13 | End: 2024-05-14 | Stop reason: HOSPADM

## 2024-05-13 RX ORDER — DILTIAZEM HYDROCHLORIDE 5 MG/ML
10-15 INJECTION INTRAVENOUS
Status: DISCONTINUED | OUTPATIENT
Start: 2024-05-13 | End: 2024-05-14 | Stop reason: HOSPADM

## 2024-05-13 RX ORDER — IVABRADINE 5 MG/1
5-15 TABLET, FILM COATED ORAL
Status: COMPLETED | OUTPATIENT
Start: 2024-05-13 | End: 2024-05-13

## 2024-05-13 RX ADMIN — IOPAMIDOL 110 ML: 755 INJECTION, SOLUTION INTRAVENOUS at 11:20

## 2024-05-13 RX ADMIN — NITROGLYCERIN 0.4 MG: 0.4 TABLET SUBLINGUAL at 10:34

## 2024-05-13 RX ADMIN — METOPROLOL TARTRATE 50 MG: 50 TABLET, FILM COATED ORAL at 09:24

## 2024-05-13 RX ADMIN — IOPAMIDOL 110 ML: 755 INJECTION, SOLUTION INTRAVENOUS at 11:14

## 2024-05-13 RX ADMIN — NITROGLYCERIN 0.4 MG: 0.4 TABLET SUBLINGUAL at 11:06

## 2024-05-13 RX ADMIN — IVABRADINE 10 MG: 5 TABLET, FILM COATED ORAL at 09:24

## 2024-06-10 ENCOUNTER — APPOINTMENT (OUTPATIENT)
Dept: URBAN - METROPOLITAN AREA CLINIC 253 | Age: 74
Setting detail: DERMATOLOGY
End: 2024-06-11

## 2024-06-10 VITALS — HEIGHT: 62 IN | WEIGHT: 206 LBS | RESPIRATION RATE: 14 BRPM

## 2024-06-10 DIAGNOSIS — D485 NEOPLASM OF UNCERTAIN BEHAVIOR OF SKIN: ICD-10-CM

## 2024-06-10 PROBLEM — D48.5 NEOPLASM OF UNCERTAIN BEHAVIOR OF SKIN: Status: ACTIVE | Noted: 2024-06-10

## 2024-06-10 PROCEDURE — OTHER COUNSELING: OTHER

## 2024-06-10 PROCEDURE — OTHER BIOPSY BY SHAVE METHOD: OTHER

## 2024-06-10 PROCEDURE — 11102 TANGNTL BX SKIN SINGLE LES: CPT

## 2024-06-10 PROCEDURE — OTHER MIPS QUALITY: OTHER

## 2024-06-10 ASSESSMENT — LOCATION ZONE DERM: LOCATION ZONE: TRUNK

## 2024-06-10 ASSESSMENT — LOCATION DETAILED DESCRIPTION DERM: LOCATION DETAILED: PERIUMBILICAL SKIN

## 2024-06-10 ASSESSMENT — LOCATION SIMPLE DESCRIPTION DERM: LOCATION SIMPLE: ABDOMEN

## 2024-06-10 NOTE — PROCEDURE: BIOPSY BY SHAVE METHOD

## 2024-06-10 NOTE — PROCEDURE: COUNSELING
Detail Level: Detailed
Patient Specific Counseling (Will Not Stick From Patient To Patient): Discussed saucerization vs. scheduling for excision. Pt opted for saucerization of lesion. Discussed that if subcutaneous nodule is still felt, pending bx, she can return for excision.

## 2024-06-10 NOTE — HPI: SKIN LESION
What Type Of Note Output Would You Prefer (Optional)?: Standard Output
Is This A New Presentation, Or A Follow-Up?: Skin Lesion
Additional History: She notes a new lesion on her abdomen that is not healing. She notes tenderness when she puts pressure on it. She states it feels deep.

## (undated) RX ORDER — IVABRADINE 5 MG/1
TABLET, FILM COATED ORAL
Status: DISPENSED
Start: 2024-05-13

## (undated) RX ORDER — METOPROLOL TARTRATE 50 MG
TABLET ORAL
Status: DISPENSED
Start: 2024-05-13